# Patient Record
Sex: FEMALE | Race: WHITE | URBAN - METROPOLITAN AREA
[De-identification: names, ages, dates, MRNs, and addresses within clinical notes are randomized per-mention and may not be internally consistent; named-entity substitution may affect disease eponyms.]

---

## 2017-11-01 PROBLEM — E66.9 OBESITY (BMI 35.0-39.9 WITHOUT COMORBIDITY): Status: ACTIVE | Noted: 2017-11-01

## 2018-02-17 PROBLEM — S82.409A FIBULA FRACTURE: Status: ACTIVE | Noted: 2018-02-17

## 2018-02-17 PROBLEM — M79.606 LEG PAIN: Status: ACTIVE | Noted: 2018-02-17

## 2018-04-24 ENCOUNTER — APPOINTMENT (RX ONLY)
Dept: URBAN - METROPOLITAN AREA CLINIC 31 | Facility: CLINIC | Age: 78
Setting detail: DERMATOLOGY
End: 2018-04-24

## 2018-04-24 DIAGNOSIS — L57.0 ACTINIC KERATOSIS: ICD-10-CM

## 2018-04-24 DIAGNOSIS — D18.0 HEMANGIOMA: ICD-10-CM

## 2018-04-24 DIAGNOSIS — L57.8 OTHER SKIN CHANGES DUE TO CHRONIC EXPOSURE TO NONIONIZING RADIATION: ICD-10-CM

## 2018-04-24 DIAGNOSIS — D69.2 OTHER NONTHROMBOCYTOPENIC PURPURA: ICD-10-CM

## 2018-04-24 DIAGNOSIS — L82.1 OTHER SEBORRHEIC KERATOSIS: ICD-10-CM

## 2018-04-24 PROBLEM — D18.01 HEMANGIOMA OF SKIN AND SUBCUTANEOUS TISSUE: Status: ACTIVE | Noted: 2018-04-24

## 2018-04-24 PROBLEM — Z85.828 PERSONAL HISTORY OF OTHER MALIGNANT NEOPLASM OF SKIN: Status: ACTIVE | Noted: 2018-04-24

## 2018-04-24 PROBLEM — D48.5 NEOPLASM OF UNCERTAIN BEHAVIOR OF SKIN: Status: ACTIVE | Noted: 2018-04-24

## 2018-04-24 PROBLEM — E05.90 THYROTOXICOSIS, UNSPECIFIED WITHOUT THYROTOXIC CRISIS OR STORM: Status: ACTIVE | Noted: 2018-04-24

## 2018-04-24 PROBLEM — E13.9 OTHER SPECIFIED DIABETES MELLITUS WITHOUT COMPLICATIONS: Status: ACTIVE | Noted: 2018-04-24

## 2018-04-24 PROCEDURE — 17004 DESTROY PREMAL LESIONS 15/>: CPT

## 2018-04-24 PROCEDURE — 99202 OFFICE O/P NEW SF 15 MIN: CPT | Mod: 25

## 2018-04-24 PROCEDURE — ? COUNSELING

## 2018-04-24 PROCEDURE — ? LIQUID NITROGEN

## 2018-04-24 PROCEDURE — 11100: CPT | Mod: 59

## 2018-04-24 PROCEDURE — ? BIOPSY BY SHAVE METHOD

## 2018-04-24 ASSESSMENT — LOCATION SIMPLE DESCRIPTION DERM
LOCATION SIMPLE: LEFT POSTERIOR UPPER ARM
LOCATION SIMPLE: CHEST
LOCATION SIMPLE: RIGHT ELBOW
LOCATION SIMPLE: LEFT HAND
LOCATION SIMPLE: RIGHT UPPER ARM
LOCATION SIMPLE: RIGHT HAND
LOCATION SIMPLE: LEFT CHEEK
LOCATION SIMPLE: LEFT ANTERIOR NECK
LOCATION SIMPLE: RIGHT FOREARM
LOCATION SIMPLE: LEFT FOREARM
LOCATION SIMPLE: UPPER BACK

## 2018-04-24 ASSESSMENT — LOCATION ZONE DERM
LOCATION ZONE: NECK
LOCATION ZONE: TRUNK
LOCATION ZONE: ARM
LOCATION ZONE: HAND
LOCATION ZONE: FACE

## 2018-04-24 ASSESSMENT — LOCATION DETAILED DESCRIPTION DERM
LOCATION DETAILED: INFERIOR THORACIC SPINE
LOCATION DETAILED: LEFT ULNAR DORSAL HAND
LOCATION DETAILED: LEFT RADIAL DORSAL HAND
LOCATION DETAILED: LEFT DISTAL POSTERIOR UPPER ARM
LOCATION DETAILED: LEFT DISTAL DORSAL FOREARM
LOCATION DETAILED: RIGHT PROXIMAL DORSAL FOREARM
LOCATION DETAILED: RIGHT LATERAL ELBOW
LOCATION DETAILED: RIGHT ANTERIOR DISTAL UPPER ARM
LOCATION DETAILED: LEFT PROXIMAL DORSAL FOREARM
LOCATION DETAILED: RIGHT DORSAL MIDDLE METACARPOPHALANGEAL JOINT
LOCATION DETAILED: LEFT SUPERIOR ANTERIOR NECK
LOCATION DETAILED: LEFT VENTRAL PROXIMAL FOREARM
LOCATION DETAILED: LEFT CENTRAL MALAR CHEEK
LOCATION DETAILED: RIGHT MEDIAL INFERIOR CHEST
LOCATION DETAILED: RIGHT RADIAL DORSAL HAND
LOCATION DETAILED: RIGHT DISTAL DORSAL FOREARM
LOCATION DETAILED: RIGHT ULNAR DORSAL HAND

## 2018-04-24 NOTE — PROCEDURE: BIOPSY BY SHAVE METHOD
Bill [id number] For Specimen Handling/Conveyance To Laboratory?: no
Cryotherapy Text: The wound bed was treated with cryotherapy after the biopsy was performed.
X Size Of Lesion In Cm: 0
Dressing: bandage
Wound Care: Petrolatum
Was A Bandage Applied: Yes
Detail Level: Detailed
Lab Facility: 
Lab: [id number]
Type Of Destruction Used: Curettage
Biopsy Type: H and E
Hemostasis: Aluminum Chloride and Electrocautery
Electrodesiccation And Curettage Text: The wound bed was treated with electrodesiccation and curettage after the biopsy was performed.
Notification Instructions: Patient will be notified of biopsy results. However, patient instructed to call the office if not contacted within 2 weeks.
Silver Nitrate Text: The wound bed was treated with silver nitrate after the biopsy was performed.
Post-Care Instructions: I reviewed with the patient in detail post-care instructions. Patient is to keep the biopsy site bandaged overnight, and then wash once daily with soap and water and then apply a thin layer of petrolatum once daily until healed.
Curettage Text: The wound bed was treated with curettage after the biopsy was performed.
Billing Type: Third-Party Bill
Consent: Written consent was obtained and risks were reviewed including but not limited to scarring, infection, bleeding, scabbing, incomplete removal, nerve damage and allergy to anesthesia.
Anesthesia Type: 1% lidocaine with epinephrine
Size Of Lesion In Cm: 0.8
Electrodesiccation Text: The wound bed was treated with electrodesiccation after the biopsy was performed.

## 2018-04-24 NOTE — PROCEDURE: LIQUID NITROGEN
Consent: The patient's consent was obtained including but not limited to risks of crusting, scabbing, blistering, scarring, darker or lighter pigmentary change, recurrence, incomplete removal and infection.
Duration Of Freeze Thaw-Cycle (Seconds): 15
Number Of Freeze-Thaw Cycles: 1 freeze-thaw cycle
Render Post-Care Instructions In Note?: no
Detail Level: Detailed
Post-Care Instructions: I reviewed with the patient in detail post-care instructions. Patient is to wear sunprotection, and avoid picking at any of the treated lesions. Pt may apply Vaseline to crusted or scabbing areas.

## 2018-05-30 PROBLEM — Z78.0 POSTMENOPAUSAL: Status: ACTIVE | Noted: 2018-01-01

## 2019-01-01 ENCOUNTER — APPOINTMENT (OUTPATIENT)
Dept: RADIOLOGY | Facility: MEDICAL CENTER | Age: 79
DRG: 082 | End: 2019-01-01
Payer: MEDICARE

## 2019-01-01 ENCOUNTER — HOSPITAL ENCOUNTER (OUTPATIENT)
Dept: RADIOLOGY | Facility: MEDICAL CENTER | Age: 79
End: 2019-03-29

## 2019-01-01 ENCOUNTER — APPOINTMENT (OUTPATIENT)
Dept: RADIOLOGY | Facility: MEDICAL CENTER | Age: 79
DRG: 082 | End: 2019-01-01
Attending: SURGERY
Payer: MEDICARE

## 2019-01-01 ENCOUNTER — HOSPITAL ENCOUNTER (INPATIENT)
Facility: MEDICAL CENTER | Age: 79
LOS: 3 days | DRG: 082 | End: 2019-04-01
Attending: EMERGENCY MEDICINE | Admitting: SURGERY
Payer: MEDICARE

## 2019-01-01 ENCOUNTER — APPOINTMENT (OUTPATIENT)
Dept: RADIOLOGY | Facility: MEDICAL CENTER | Age: 79
DRG: 082 | End: 2019-01-01
Attending: NURSE PRACTITIONER
Payer: MEDICARE

## 2019-01-01 VITALS
TEMPERATURE: 99 F | HEIGHT: 66 IN | HEART RATE: 90 BPM | WEIGHT: 193.34 LBS | SYSTOLIC BLOOD PRESSURE: 120 MMHG | DIASTOLIC BLOOD PRESSURE: 58 MMHG | RESPIRATION RATE: 5 BRPM | BODY MASS INDEX: 31.07 KG/M2 | OXYGEN SATURATION: 40 %

## 2019-01-01 LAB
ABO GROUP BLD: NORMAL
ABO GROUP BLD: NORMAL
ACTION RANGE TRIGGERED IACRT: NO
ALBUMIN SERPL BCP-MCNC: 3.6 G/DL (ref 3.2–4.9)
ALBUMIN SERPL BCP-MCNC: 3.8 G/DL (ref 3.2–4.9)
ALBUMIN SERPL BCP-MCNC: 4 G/DL (ref 3.2–4.9)
ALBUMIN/GLOB SERPL: 1.3 G/DL
ALP SERPL-CCNC: 66 U/L (ref 30–99)
ALP SERPL-CCNC: 74 U/L (ref 30–99)
ALP SERPL-CCNC: 77 U/L (ref 30–99)
ALT SERPL-CCNC: 11 U/L (ref 2–50)
ALT SERPL-CCNC: 13 U/L (ref 2–50)
ALT SERPL-CCNC: 15 U/L (ref 2–50)
ANION GAP SERPL CALC-SCNC: 15 MMOL/L (ref 0–11.9)
ANION GAP SERPL CALC-SCNC: 8 MMOL/L (ref 0–11.9)
ANION GAP SERPL CALC-SCNC: 8 MMOL/L (ref 0–11.9)
ANION GAP SERPL CALC-SCNC: 9 MMOL/L (ref 0–11.9)
APTT PPP: 29.4 SEC (ref 24.7–36)
AST SERPL-CCNC: 21 U/L (ref 12–45)
AST SERPL-CCNC: 25 U/L (ref 12–45)
AST SERPL-CCNC: 28 U/L (ref 12–45)
BARCODED ABORH UBTYP: 5100
BARCODED ABORH UBTYP: 6200
BARCODED ABORH UBTYP: 6200
BARCODED PRD CODE UBPRD: NORMAL
BARCODED UNIT NUM UBUNT: NORMAL
BASE EXCESS BLDA CALC-SCNC: -5 MMOL/L (ref -4–3)
BASOPHILS # BLD AUTO: 0.2 % (ref 0–1.8)
BASOPHILS # BLD AUTO: 0.2 % (ref 0–1.8)
BASOPHILS # BLD: 0.03 K/UL (ref 0–0.12)
BASOPHILS # BLD: 0.04 K/UL (ref 0–0.12)
BILIRUB SERPL-MCNC: 0.6 MG/DL (ref 0.1–1.5)
BILIRUB SERPL-MCNC: 0.8 MG/DL (ref 0.1–1.5)
BILIRUB SERPL-MCNC: 0.9 MG/DL (ref 0.1–1.5)
BLD GP AB SCN SERPL QL: NORMAL
BODY TEMPERATURE: ABNORMAL DEGREES
BUN SERPL-MCNC: 21 MG/DL (ref 8–22)
BUN SERPL-MCNC: 22 MG/DL (ref 8–22)
BUN SERPL-MCNC: 23 MG/DL (ref 8–22)
BUN SERPL-MCNC: 23 MG/DL (ref 8–22)
CALCIUM SERPL-MCNC: 10.2 MG/DL (ref 8.5–10.5)
CALCIUM SERPL-MCNC: 10.5 MG/DL (ref 8.5–10.5)
CALCIUM SERPL-MCNC: 9.5 MG/DL (ref 8.5–10.5)
CALCIUM SERPL-MCNC: 9.9 MG/DL (ref 8.5–10.5)
CFT BLD TEG: 4.2 MIN (ref 5–10)
CFT BLD TEG: 4.3 MIN (ref 5–10)
CFT BLD TEG: 4.3 MIN (ref 5–10)
CFT BLD TEG: 4.5 MIN (ref 5–10)
CHLORIDE SERPL-SCNC: 100 MMOL/L (ref 96–112)
CHLORIDE SERPL-SCNC: 101 MMOL/L (ref 96–112)
CLOT ANGLE BLD TEG: 74.3 DEGREES (ref 53–72)
CLOT ANGLE BLD TEG: 74.9 DEGREES (ref 53–72)
CLOT ANGLE BLD TEG: 75.4 DEGREES (ref 53–72)
CLOT ANGLE BLD TEG: 75.5 DEGREES (ref 53–72)
CLOT LYSIS 30M P MA LENFR BLD TEG: 0 % (ref 0–8)
CO2 BLDA-SCNC: 22 MMOL/L (ref 20–33)
CO2 SERPL-SCNC: 17 MMOL/L (ref 20–33)
CO2 SERPL-SCNC: 20 MMOL/L (ref 20–33)
CO2 SERPL-SCNC: 21 MMOL/L (ref 20–33)
CO2 SERPL-SCNC: 23 MMOL/L (ref 20–33)
COMPONENT P 8504P: NORMAL
CREAT SERPL-MCNC: 0.78 MG/DL (ref 0.5–1.4)
CREAT SERPL-MCNC: 0.98 MG/DL (ref 0.5–1.4)
CREAT SERPL-MCNC: 1.11 MG/DL (ref 0.5–1.4)
CREAT SERPL-MCNC: 1.45 MG/DL (ref 0.5–1.4)
CT.EXTRINSIC BLD ROTEM: 1 MIN (ref 1–3)
CT.EXTRINSIC BLD ROTEM: 1.1 MIN (ref 1–3)
EOSINOPHIL # BLD AUTO: 0 K/UL (ref 0–0.51)
EOSINOPHIL # BLD AUTO: 0.02 K/UL (ref 0–0.51)
EOSINOPHIL NFR BLD: 0 % (ref 0–6.9)
EOSINOPHIL NFR BLD: 0.1 % (ref 0–6.9)
ERYTHROCYTE [DISTWIDTH] IN BLOOD BY AUTOMATED COUNT: 49.6 FL (ref 35.9–50)
ERYTHROCYTE [DISTWIDTH] IN BLOOD BY AUTOMATED COUNT: 50 FL (ref 35.9–50)
ERYTHROCYTE [DISTWIDTH] IN BLOOD BY AUTOMATED COUNT: 51.9 FL (ref 35.9–50)
EST. AVERAGE GLUCOSE BLD GHB EST-MCNC: 128 MG/DL
ETHANOL BLD-MCNC: 0 G/DL
GLOBULIN SER CALC-MCNC: 2.8 G/DL (ref 1.9–3.5)
GLOBULIN SER CALC-MCNC: 3 G/DL (ref 1.9–3.5)
GLOBULIN SER CALC-MCNC: 3 G/DL (ref 1.9–3.5)
GLUCOSE BLD-MCNC: 127 MG/DL (ref 65–99)
GLUCOSE BLD-MCNC: 137 MG/DL (ref 65–99)
GLUCOSE BLD-MCNC: 147 MG/DL (ref 65–99)
GLUCOSE BLD-MCNC: 164 MG/DL (ref 65–99)
GLUCOSE BLD-MCNC: 168 MG/DL (ref 65–99)
GLUCOSE BLD-MCNC: 172 MG/DL (ref 65–99)
GLUCOSE BLD-MCNC: 176 MG/DL (ref 65–99)
GLUCOSE SERPL-MCNC: 129 MG/DL (ref 65–99)
GLUCOSE SERPL-MCNC: 142 MG/DL (ref 65–99)
GLUCOSE SERPL-MCNC: 142 MG/DL (ref 65–99)
GLUCOSE SERPL-MCNC: 180 MG/DL (ref 65–99)
HBA1C MFR BLD: 6.1 % (ref 0–5.6)
HCO3 BLDA-SCNC: 20.6 MMOL/L (ref 17–25)
HCT VFR BLD AUTO: 31.8 % (ref 37–47)
HCT VFR BLD AUTO: 32.8 % (ref 37–47)
HCT VFR BLD AUTO: 34.5 % (ref 37–47)
HGB BLD-MCNC: 10.2 G/DL (ref 12–16)
HGB BLD-MCNC: 10.4 G/DL (ref 12–16)
HGB BLD-MCNC: 11.2 G/DL (ref 12–16)
HOROWITZ INDEX BLDA+IHG-RTO: 134 MM[HG]
IMM GRANULOCYTES # BLD AUTO: 0.06 K/UL (ref 0–0.11)
IMM GRANULOCYTES # BLD AUTO: 0.07 K/UL (ref 0–0.11)
IMM GRANULOCYTES NFR BLD AUTO: 0.4 % (ref 0–0.9)
IMM GRANULOCYTES NFR BLD AUTO: 0.5 % (ref 0–0.9)
INR PPP: 1.06 (ref 0.87–1.13)
INST. QUALIFIED PATIENT IIQPT: YES
LACTATE BLD-SCNC: 4.3 MMOL/L (ref 0.5–2)
LYMPHOCYTES # BLD AUTO: 0.8 K/UL (ref 1–4.8)
LYMPHOCYTES # BLD AUTO: 1.03 K/UL (ref 1–4.8)
LYMPHOCYTES NFR BLD: 4.7 % (ref 22–41)
LYMPHOCYTES NFR BLD: 7.2 % (ref 22–41)
MAGNESIUM SERPL-MCNC: 1.3 MG/DL (ref 1.5–2.5)
MAGNESIUM SERPL-MCNC: 1.4 MG/DL (ref 1.5–2.5)
MCF BLD TEG: 76.3 MM (ref 50–70)
MCF BLD TEG: 76.9 MM (ref 50–70)
MCF BLD TEG: 77.4 MM (ref 50–70)
MCF BLD TEG: 77.8 MM (ref 50–70)
MCH RBC QN AUTO: 29.5 PG (ref 27–33)
MCH RBC QN AUTO: 30.1 PG (ref 27–33)
MCH RBC QN AUTO: 30.2 PG (ref 27–33)
MCHC RBC AUTO-ENTMCNC: 31.7 G/DL (ref 33.6–35)
MCHC RBC AUTO-ENTMCNC: 32.1 G/DL (ref 33.6–35)
MCHC RBC AUTO-ENTMCNC: 32.5 G/DL (ref 33.6–35)
MCV RBC AUTO: 92.7 FL (ref 81.4–97.8)
MCV RBC AUTO: 93.2 FL (ref 81.4–97.8)
MCV RBC AUTO: 94.1 FL (ref 81.4–97.8)
MONOCYTES # BLD AUTO: 1.15 K/UL (ref 0–0.85)
MONOCYTES # BLD AUTO: 1.43 K/UL (ref 0–0.85)
MONOCYTES NFR BLD AUTO: 10 % (ref 0–13.4)
MONOCYTES NFR BLD AUTO: 6.8 % (ref 0–13.4)
NEUTROPHILS # BLD AUTO: 11.71 K/UL (ref 2–7.15)
NEUTROPHILS # BLD AUTO: 14.84 K/UL (ref 2–7.15)
NEUTROPHILS NFR BLD: 82 % (ref 44–72)
NEUTROPHILS NFR BLD: 87.9 % (ref 44–72)
NRBC # BLD AUTO: 0 K/UL
NRBC # BLD AUTO: 0 K/UL
NRBC BLD-RTO: 0 /100 WBC
NRBC BLD-RTO: 0 /100 WBC
O2/TOTAL GAS SETTING VFR VENT: 70 %
PA AA BLD-ACNC: 67.6 %
PA AA BLD-ACNC: 78 %
PA AA BLD-ACNC: 85.6 %
PA AA BLD-ACNC: 94.8 %
PA ADP BLD-ACNC: 77.3 %
PA ADP BLD-ACNC: 88.6 %
PA ADP BLD-ACNC: 93.5 %
PA ADP BLD-ACNC: 93.6 %
PCO2 BLDA: 37.7 MMHG (ref 26–37)
PCO2 TEMP ADJ BLDA: 39 MMHG (ref 26–37)
PH BLDA: 7.34 [PH] (ref 7.4–7.5)
PH TEMP ADJ BLDA: 7.33 [PH] (ref 7.4–7.5)
PHOSPHATE SERPL-MCNC: 2.5 MG/DL (ref 2.5–4.5)
PHOSPHATE SERPL-MCNC: 2.9 MG/DL (ref 2.5–4.5)
PLATELET # BLD AUTO: 222 K/UL (ref 164–446)
PLATELET # BLD AUTO: 265 K/UL (ref 164–446)
PLATELET # BLD AUTO: 268 K/UL (ref 164–446)
PMV BLD AUTO: 11.2 FL (ref 9–12.9)
PMV BLD AUTO: 11.2 FL (ref 9–12.9)
PMV BLD AUTO: 11.4 FL (ref 9–12.9)
PO2 BLDA: 94 MMHG (ref 64–87)
PO2 TEMP ADJ BLDA: 99 MMHG (ref 64–87)
POTASSIUM SERPL-SCNC: 4 MMOL/L (ref 3.6–5.5)
POTASSIUM SERPL-SCNC: 4.3 MMOL/L (ref 3.6–5.5)
POTASSIUM SERPL-SCNC: 4.9 MMOL/L (ref 3.6–5.5)
POTASSIUM SERPL-SCNC: 4.9 MMOL/L (ref 3.6–5.5)
PRODUCT TYPE UPROD: NORMAL
PROT SERPL-MCNC: 6.4 G/DL (ref 6–8.2)
PROT SERPL-MCNC: 6.8 G/DL (ref 6–8.2)
PROT SERPL-MCNC: 7 G/DL (ref 6–8.2)
PROTHROMBIN TIME: 13.9 SEC (ref 12–14.6)
RBC # BLD AUTO: 3.38 M/UL (ref 4.2–5.4)
RBC # BLD AUTO: 3.52 M/UL (ref 4.2–5.4)
RBC # BLD AUTO: 3.72 M/UL (ref 4.2–5.4)
RH BLD: NORMAL
RH BLD: NORMAL
SAO2 % BLDA: 97 % (ref 93–99)
SODIUM SERPL-SCNC: 128 MMOL/L (ref 135–145)
SODIUM SERPL-SCNC: 130 MMOL/L (ref 135–145)
SODIUM SERPL-SCNC: 131 MMOL/L (ref 135–145)
SODIUM SERPL-SCNC: 133 MMOL/L (ref 135–145)
SPECIMEN DRAWN FROM PATIENT: ABNORMAL
TEG ALGORITHM TGALG: ABNORMAL
TRIGL SERPL-MCNC: 113 MG/DL (ref 0–149)
TRIGL SERPL-MCNC: 117 MG/DL (ref 0–149)
TSH SERPL DL<=0.005 MIU/L-ACNC: 0.84 UIU/ML (ref 0.38–5.33)
TSH SERPL DL<=0.005 MIU/L-ACNC: 1.38 UIU/ML (ref 0.38–5.33)
UNIT STATUS USTAT: NORMAL
WBC # BLD AUTO: 14.3 K/UL (ref 4.8–10.8)
WBC # BLD AUTO: 16.9 K/UL (ref 4.8–10.8)
WBC # BLD AUTO: 17.1 K/UL (ref 4.8–10.8)

## 2019-01-01 PROCEDURE — A9270 NON-COVERED ITEM OR SERVICE: HCPCS | Performed by: NURSE PRACTITIONER

## 2019-01-01 PROCEDURE — 85576 BLOOD PLATELET AGGREGATION: CPT | Mod: 91

## 2019-01-01 PROCEDURE — 71045 X-RAY EXAM CHEST 1 VIEW: CPT

## 2019-01-01 PROCEDURE — 0BH17EZ INSERTION OF ENDOTRACHEAL AIRWAY INTO TRACHEA, VIA NATURAL OR ARTIFICIAL OPENING: ICD-10-PCS | Performed by: SURGERY

## 2019-01-01 PROCEDURE — 770022 HCHG ROOM/CARE - ICU (200)

## 2019-01-01 PROCEDURE — 302977 HCHG BRONCHOSCOPY PROC-THERAPEUTIC

## 2019-01-01 PROCEDURE — 86850 RBC ANTIBODY SCREEN: CPT

## 2019-01-01 PROCEDURE — G0390 TRAUMA RESPONS W/HOSP CRITI: HCPCS

## 2019-01-01 PROCEDURE — 700111 HCHG RX REV CODE 636 W/ 250 OVERRIDE (IP): Performed by: NURSE PRACTITIONER

## 2019-01-01 PROCEDURE — 92950 HEART/LUNG RESUSCITATION CPR: CPT

## 2019-01-01 PROCEDURE — 84100 ASSAY OF PHOSPHORUS: CPT

## 2019-01-01 PROCEDURE — A9270 NON-COVERED ITEM OR SERVICE: HCPCS | Performed by: SURGERY

## 2019-01-01 PROCEDURE — 82803 BLOOD GASES ANY COMBINATION: CPT

## 2019-01-01 PROCEDURE — 700105 HCHG RX REV CODE 258: Performed by: NURSE PRACTITIONER

## 2019-01-01 PROCEDURE — 700112 HCHG RX REV CODE 229: Performed by: NURSE PRACTITIONER

## 2019-01-01 PROCEDURE — 82962 GLUCOSE BLOOD TEST: CPT | Mod: 91

## 2019-01-01 PROCEDURE — 36600 WITHDRAWAL OF ARTERIAL BLOOD: CPT

## 2019-01-01 PROCEDURE — 0B918ZZ DRAINAGE OF TRACHEA, VIA NATURAL OR ARTIFICIAL OPENING ENDOSCOPIC: ICD-10-PCS | Performed by: SURGERY

## 2019-01-01 PROCEDURE — 700111 HCHG RX REV CODE 636 W/ 250 OVERRIDE (IP): Performed by: SURGERY

## 2019-01-01 PROCEDURE — 94002 VENT MGMT INPAT INIT DAY: CPT

## 2019-01-01 PROCEDURE — 99291 CRITICAL CARE FIRST HOUR: CPT | Performed by: SURGERY

## 2019-01-01 PROCEDURE — 31500 INSERT EMERGENCY AIRWAY: CPT

## 2019-01-01 PROCEDURE — 80053 COMPREHEN METABOLIC PANEL: CPT

## 2019-01-01 PROCEDURE — 700102 HCHG RX REV CODE 250 W/ 637 OVERRIDE(OP): Performed by: NURSE PRACTITIONER

## 2019-01-01 PROCEDURE — P9034 PLATELETS, PHERESIS: HCPCS

## 2019-01-01 PROCEDURE — 30233N1 TRANSFUSION OF NONAUTOLOGOUS RED BLOOD CELLS INTO PERIPHERAL VEIN, PERCUTANEOUS APPROACH: ICD-10-PCS | Performed by: SURGERY

## 2019-01-01 PROCEDURE — 36430 TRANSFUSION BLD/BLD COMPNT: CPT

## 2019-01-01 PROCEDURE — 700102 HCHG RX REV CODE 250 W/ 637 OVERRIDE(OP): Performed by: SURGERY

## 2019-01-01 PROCEDURE — 84478 ASSAY OF TRIGLYCERIDES: CPT

## 2019-01-01 PROCEDURE — 700101 HCHG RX REV CODE 250: Performed by: SURGERY

## 2019-01-01 PROCEDURE — 84443 ASSAY THYROID STIM HORMONE: CPT

## 2019-01-01 PROCEDURE — 85610 PROTHROMBIN TIME: CPT | Mod: 91

## 2019-01-01 PROCEDURE — 85384 FIBRINOGEN ACTIVITY: CPT

## 2019-01-01 PROCEDURE — 80048 BASIC METABOLIC PNL TOTAL CA: CPT

## 2019-01-01 PROCEDURE — 93970 EXTREMITY STUDY: CPT

## 2019-01-01 PROCEDURE — 85347 COAGULATION TIME ACTIVATED: CPT

## 2019-01-01 PROCEDURE — 94003 VENT MGMT INPAT SUBQ DAY: CPT

## 2019-01-01 PROCEDURE — 83735 ASSAY OF MAGNESIUM: CPT

## 2019-01-01 PROCEDURE — 86900 BLOOD TYPING SEROLOGIC ABO: CPT

## 2019-01-01 PROCEDURE — 82962 GLUCOSE BLOOD TEST: CPT

## 2019-01-01 PROCEDURE — 99291 CRITICAL CARE FIRST HOUR: CPT

## 2019-01-01 PROCEDURE — 99152 MOD SED SAME PHYS/QHP 5/>YRS: CPT

## 2019-01-01 PROCEDURE — 5A1945Z RESPIRATORY VENTILATION, 24-96 CONSECUTIVE HOURS: ICD-10-PCS | Performed by: SURGERY

## 2019-01-01 PROCEDURE — 85025 COMPLETE CBC W/AUTO DIFF WBC: CPT

## 2019-01-01 PROCEDURE — 85027 COMPLETE CBC AUTOMATED: CPT

## 2019-01-01 PROCEDURE — 99153 MOD SED SAME PHYS/QHP EA: CPT

## 2019-01-01 PROCEDURE — 86901 BLOOD TYPING SEROLOGIC RH(D): CPT

## 2019-01-01 PROCEDURE — 83605 ASSAY OF LACTIC ACID: CPT

## 2019-01-01 PROCEDURE — 70450 CT HEAD/BRAIN W/O DYE: CPT

## 2019-01-01 PROCEDURE — 83036 HEMOGLOBIN GLYCOSYLATED A1C: CPT

## 2019-01-01 PROCEDURE — 85730 THROMBOPLASTIN TIME PARTIAL: CPT

## 2019-01-01 PROCEDURE — 99222 1ST HOSP IP/OBS MODERATE 55: CPT | Performed by: INTERNAL MEDICINE

## 2019-01-01 RX ORDER — SODIUM CHLORIDE 9 MG/ML
INJECTION, SOLUTION INTRAVENOUS CONTINUOUS
Status: DISCONTINUED | OUTPATIENT
Start: 2019-01-01 | End: 2019-01-01

## 2019-01-01 RX ORDER — DEXTROSE MONOHYDRATE 25 G/50ML
25 INJECTION, SOLUTION INTRAVENOUS
Status: DISCONTINUED | OUTPATIENT
Start: 2019-01-01 | End: 2019-01-01

## 2019-01-01 RX ORDER — LOSARTAN POTASSIUM 50 MG/1
100 TABLET ORAL DAILY
Status: DISCONTINUED | OUTPATIENT
Start: 2019-01-01 | End: 2019-01-01

## 2019-01-01 RX ORDER — SIMVASTATIN 20 MG
20 TABLET ORAL EVERY EVENING
Status: DISCONTINUED | OUTPATIENT
Start: 2019-01-01 | End: 2019-01-01

## 2019-01-01 RX ORDER — FAMOTIDINE 20 MG/1
20 TABLET, FILM COATED ORAL EVERY 24 HOURS
Status: DISCONTINUED | OUTPATIENT
Start: 2019-01-01 | End: 2019-01-01

## 2019-01-01 RX ORDER — FAMOTIDINE 20 MG/1
20 TABLET, FILM COATED ORAL EVERY 12 HOURS
Status: DISCONTINUED | OUTPATIENT
Start: 2019-01-01 | End: 2019-01-01

## 2019-01-01 RX ORDER — DOCUSATE SODIUM 100 MG/1
100 CAPSULE, LIQUID FILLED ORAL 2 TIMES DAILY
Status: DISCONTINUED | OUTPATIENT
Start: 2019-01-01 | End: 2019-01-01

## 2019-01-01 RX ORDER — POLYETHYLENE GLYCOL 3350 17 G/17G
1 POWDER, FOR SOLUTION ORAL 2 TIMES DAILY
Status: DISCONTINUED | OUTPATIENT
Start: 2019-01-01 | End: 2019-01-01

## 2019-01-01 RX ORDER — HALOPERIDOL 5 MG/ML
1 INJECTION INTRAMUSCULAR EVERY 4 HOURS PRN
Status: DISCONTINUED | OUTPATIENT
Start: 2019-01-01 | End: 2019-01-01

## 2019-01-01 RX ORDER — MORPHINE SULFATE 10 MG/ML
5 INJECTION, SOLUTION INTRAMUSCULAR; INTRAVENOUS
Status: DISCONTINUED | OUTPATIENT
Start: 2019-01-01 | End: 2019-01-01 | Stop reason: HOSPADM

## 2019-01-01 RX ORDER — CITALOPRAM 20 MG/1
20 TABLET ORAL DAILY
Status: DISCONTINUED | OUTPATIENT
Start: 2019-01-01 | End: 2019-01-01

## 2019-01-01 RX ORDER — VECURONIUM BROMIDE 1 MG/ML
10 INJECTION, POWDER, LYOPHILIZED, FOR SOLUTION INTRAVENOUS ONCE
Status: COMPLETED | OUTPATIENT
Start: 2019-01-01 | End: 2019-01-01

## 2019-01-01 RX ORDER — BUSPIRONE HYDROCHLORIDE 5 MG/1
7.5 TABLET ORAL 2 TIMES DAILY
Status: DISCONTINUED | OUTPATIENT
Start: 2019-01-01 | End: 2019-01-01

## 2019-01-01 RX ORDER — MAGNESIUM SULFATE HEPTAHYDRATE 40 MG/ML
4 INJECTION, SOLUTION INTRAVENOUS ONCE
Status: DISCONTINUED | OUTPATIENT
Start: 2019-01-01 | End: 2019-01-01

## 2019-01-01 RX ORDER — ATROPINE SULFATE 10 MG/ML
2 SOLUTION/ DROPS OPHTHALMIC EVERY 4 HOURS PRN
Status: DISCONTINUED | OUTPATIENT
Start: 2019-01-01 | End: 2019-01-01 | Stop reason: HOSPADM

## 2019-01-01 RX ORDER — DONEPEZIL HYDROCHLORIDE 5 MG/1
10 TABLET, FILM COATED ORAL
Status: DISCONTINUED | OUTPATIENT
Start: 2019-01-01 | End: 2019-01-01

## 2019-01-01 RX ORDER — OXYCODONE HYDROCHLORIDE 5 MG/1
5 TABLET ORAL
Status: DISCONTINUED | OUTPATIENT
Start: 2019-01-01 | End: 2019-01-01

## 2019-01-01 RX ORDER — LORAZEPAM 2 MG/ML
1 CONCENTRATE ORAL
Status: DISCONTINUED | OUTPATIENT
Start: 2019-01-01 | End: 2019-01-01 | Stop reason: HOSPADM

## 2019-01-01 RX ORDER — ONDANSETRON 2 MG/ML
4 INJECTION INTRAMUSCULAR; INTRAVENOUS EVERY 4 HOURS PRN
Status: DISCONTINUED | OUTPATIENT
Start: 2019-01-01 | End: 2019-01-01 | Stop reason: HOSPADM

## 2019-01-01 RX ORDER — ENEMA 19; 7 G/133ML; G/133ML
1 ENEMA RECTAL
Status: DISCONTINUED | OUTPATIENT
Start: 2019-01-01 | End: 2019-01-01

## 2019-01-01 RX ORDER — OXYCODONE HYDROCHLORIDE 10 MG/1
10 TABLET ORAL
Status: DISCONTINUED | OUTPATIENT
Start: 2019-01-01 | End: 2019-01-01

## 2019-01-01 RX ORDER — PROPOFOL 10 MG/ML
100 INJECTION, EMULSION INTRAVENOUS ONCE
Status: COMPLETED | OUTPATIENT
Start: 2019-01-01 | End: 2019-01-01

## 2019-01-01 RX ORDER — HYDRALAZINE HYDROCHLORIDE 20 MG/ML
20 INJECTION INTRAMUSCULAR; INTRAVENOUS EVERY 6 HOURS PRN
Status: DISCONTINUED | OUTPATIENT
Start: 2019-01-01 | End: 2019-01-01

## 2019-01-01 RX ORDER — DONEPEZIL HYDROCHLORIDE 5 MG/1
10 TABLET, FILM COATED ORAL EVERY EVENING
Status: DISCONTINUED | OUTPATIENT
Start: 2019-01-01 | End: 2019-01-01

## 2019-01-01 RX ORDER — AMOXICILLIN 250 MG
1 CAPSULE ORAL
Status: DISCONTINUED | OUTPATIENT
Start: 2019-01-01 | End: 2019-01-01

## 2019-01-01 RX ORDER — BISACODYL 10 MG
10 SUPPOSITORY, RECTAL RECTAL
Status: DISCONTINUED | OUTPATIENT
Start: 2019-01-01 | End: 2019-01-01

## 2019-01-01 RX ORDER — AMOXICILLIN 250 MG
1 CAPSULE ORAL NIGHTLY
Status: DISCONTINUED | OUTPATIENT
Start: 2019-01-01 | End: 2019-01-01

## 2019-01-01 RX ORDER — LORAZEPAM 2 MG/ML
1 INJECTION INTRAMUSCULAR
Status: DISCONTINUED | OUTPATIENT
Start: 2019-01-01 | End: 2019-01-01 | Stop reason: HOSPADM

## 2019-01-01 RX ORDER — LEVETIRACETAM 500 MG/1
500 TABLET ORAL 2 TIMES DAILY
Status: DISCONTINUED | OUTPATIENT
Start: 2019-01-01 | End: 2019-01-01

## 2019-01-01 RX ORDER — DOCUSATE SODIUM 50 MG/5ML
100 LIQUID ORAL 2 TIMES DAILY
Status: DISCONTINUED | OUTPATIENT
Start: 2019-01-01 | End: 2019-01-01

## 2019-01-01 RX ORDER — LEVOTHYROXINE SODIUM 0.12 MG/1
125 TABLET ORAL
Status: DISCONTINUED | OUTPATIENT
Start: 2019-01-01 | End: 2019-01-01

## 2019-01-01 RX ORDER — MORPHINE SULFATE 10 MG/ML
10 INJECTION, SOLUTION INTRAMUSCULAR; INTRAVENOUS
Status: DISCONTINUED | OUTPATIENT
Start: 2019-01-01 | End: 2019-01-01 | Stop reason: HOSPADM

## 2019-01-01 RX ADMIN — MORPHINE SULFATE 10 MG: 10 INJECTION INTRAVENOUS at 23:57

## 2019-01-01 RX ADMIN — SODIUM CHLORIDE: 9 INJECTION, SOLUTION INTRAVENOUS at 21:33

## 2019-01-01 RX ADMIN — LORAZEPAM 1 MG: 2 INJECTION INTRAMUSCULAR; INTRAVENOUS at 20:22

## 2019-01-01 RX ADMIN — LORAZEPAM 1 MG: 2 INJECTION INTRAMUSCULAR; INTRAVENOUS at 21:36

## 2019-01-01 RX ADMIN — LORAZEPAM 1 MG: 2 INJECTION INTRAMUSCULAR; INTRAVENOUS at 22:43

## 2019-01-01 RX ADMIN — ATROPINE SULFATE 2 DROP: 10 SOLUTION/ DROPS OPHTHALMIC at 00:00

## 2019-01-01 RX ADMIN — PROPOFOL 65 MCG/KG/MIN: 10 INJECTION, EMULSION INTRAVENOUS at 15:53

## 2019-01-01 RX ADMIN — LORAZEPAM 1 MG: 2 INJECTION INTRAMUSCULAR; INTRAVENOUS at 00:59

## 2019-01-01 RX ADMIN — CITALOPRAM HYDROBROMIDE 20 MG: 20 TABLET ORAL at 11:47

## 2019-01-01 RX ADMIN — POLYETHYLENE GLYCOL 3350 1 PACKET: 17 POWDER, FOR SOLUTION ORAL at 18:00

## 2019-01-01 RX ADMIN — SENNOSIDES AND DOCUSATE SODIUM 1 TABLET: 8.6; 5 TABLET ORAL at 21:00

## 2019-01-01 RX ADMIN — PROPOFOL 50 MCG/KG/MIN: 10 INJECTION, EMULSION INTRAVENOUS at 09:26

## 2019-01-01 RX ADMIN — BUSPIRONE HYDROCHLORIDE 7.5 MG: 5 TABLET ORAL at 11:46

## 2019-01-01 RX ADMIN — FENTANYL CITRATE 50 MCG: 50 INJECTION INTRAMUSCULAR; INTRAVENOUS at 03:02

## 2019-01-01 RX ADMIN — PROPOFOL 60 MCG/KG/MIN: 10 INJECTION, EMULSION INTRAVENOUS at 04:14

## 2019-01-01 RX ADMIN — POLYETHYLENE GLYCOL 3350 1 PACKET: 17 POWDER, FOR SOLUTION ORAL at 05:11

## 2019-01-01 RX ADMIN — HYDRALAZINE HYDROCHLORIDE 20 MG: 20 INJECTION INTRAMUSCULAR; INTRAVENOUS at 10:20

## 2019-01-01 RX ADMIN — PROPOFOL 65 MCG/KG/MIN: 10 INJECTION, EMULSION INTRAVENOUS at 08:18

## 2019-01-01 RX ADMIN — PROPOFOL 65 MCG/KG/MIN: 10 INJECTION, EMULSION INTRAVENOUS at 02:40

## 2019-01-01 RX ADMIN — DOCUSATE SODIUM 100 MG: 50 LIQUID ORAL at 17:24

## 2019-01-01 RX ADMIN — PROPOFOL 45 MCG/KG/MIN: 10 INJECTION, EMULSION INTRAVENOUS at 22:03

## 2019-01-01 RX ADMIN — LOSARTAN POTASSIUM 100 MG: 50 TABLET ORAL at 05:01

## 2019-01-01 RX ADMIN — FENTANYL CITRATE 100 MCG: 50 INJECTION INTRAMUSCULAR; INTRAVENOUS at 22:45

## 2019-01-01 RX ADMIN — MORPHINE SULFATE 10 MG: 10 INJECTION INTRAVENOUS at 21:36

## 2019-01-01 RX ADMIN — LEVOTHYROXINE SODIUM 125 MCG: 125 TABLET ORAL at 05:02

## 2019-01-01 RX ADMIN — FENTANYL CITRATE 100 MCG: 50 INJECTION INTRAMUSCULAR; INTRAVENOUS at 00:55

## 2019-01-01 RX ADMIN — SODIUM CHLORIDE: 9 INJECTION, SOLUTION INTRAVENOUS at 16:00

## 2019-01-01 RX ADMIN — PROPOFOL 60 MCG/KG/MIN: 10 INJECTION, EMULSION INTRAVENOUS at 18:31

## 2019-01-01 RX ADMIN — LEVOTHYROXINE SODIUM 125 MCG: 125 TABLET ORAL at 11:46

## 2019-01-01 RX ADMIN — MORPHINE SULFATE 10 MG: 10 INJECTION INTRAVENOUS at 16:07

## 2019-01-01 RX ADMIN — PROPOFOL 100 MG: 10 INJECTION, EMULSION INTRAVENOUS at 19:54

## 2019-01-01 RX ADMIN — PROPOFOL 50 MCG/KG/MIN: 10 INJECTION, EMULSION INTRAVENOUS at 13:00

## 2019-01-01 RX ADMIN — LORAZEPAM 1 MG: 2 INJECTION INTRAMUSCULAR; INTRAVENOUS at 02:08

## 2019-01-01 RX ADMIN — FENTANYL CITRATE 100 MCG: 50 INJECTION INTRAMUSCULAR; INTRAVENOUS at 06:54

## 2019-01-01 RX ADMIN — HYDRALAZINE HYDROCHLORIDE 20 MG: 20 INJECTION INTRAMUSCULAR; INTRAVENOUS at 18:27

## 2019-01-01 RX ADMIN — SODIUM CHLORIDE 500 MG: 9 INJECTION, SOLUTION INTRAVENOUS at 05:01

## 2019-01-01 RX ADMIN — FAMOTIDINE 20 MG: 10 INJECTION INTRAVENOUS at 05:11

## 2019-01-01 RX ADMIN — MORPHINE SULFATE 10 MG: 10 INJECTION INTRAVENOUS at 00:59

## 2019-01-01 RX ADMIN — LORAZEPAM 1 MG: 2 INJECTION INTRAMUSCULAR; INTRAVENOUS at 23:56

## 2019-01-01 RX ADMIN — PROPOFOL 60 MCG/KG/MIN: 10 INJECTION, EMULSION INTRAVENOUS at 21:33

## 2019-01-01 RX ADMIN — FENTANYL CITRATE 100 MCG: 50 INJECTION INTRAMUSCULAR; INTRAVENOUS at 20:30

## 2019-01-01 RX ADMIN — MORPHINE SULFATE 10 MG: 10 INJECTION INTRAVENOUS at 17:14

## 2019-01-01 RX ADMIN — SODIUM CHLORIDE 500 MG: 9 INJECTION, SOLUTION INTRAVENOUS at 17:26

## 2019-01-01 RX ADMIN — SODIUM CHLORIDE 500 MG: 9 INJECTION, SOLUTION INTRAVENOUS at 05:11

## 2019-01-01 RX ADMIN — PROPOFOL 65 MCG/KG/MIN: 10 INJECTION, EMULSION INTRAVENOUS at 00:37

## 2019-01-01 RX ADMIN — PROPOFOL 65 MCG/KG/MIN: 10 INJECTION, EMULSION INTRAVENOUS at 12:03

## 2019-01-01 RX ADMIN — SODIUM CHLORIDE 500 MG: 9 INJECTION, SOLUTION INTRAVENOUS at 17:24

## 2019-01-01 RX ADMIN — PROPOFOL 60 MCG/KG/MIN: 10 INJECTION, EMULSION INTRAVENOUS at 01:02

## 2019-01-01 RX ADMIN — SODIUM CHLORIDE: 9 INJECTION, SOLUTION INTRAVENOUS at 08:40

## 2019-01-01 RX ADMIN — MORPHINE SULFATE 10 MG: 10 INJECTION INTRAVENOUS at 22:43

## 2019-01-01 RX ADMIN — FENTANYL CITRATE 50 MCG: 50 INJECTION INTRAMUSCULAR; INTRAVENOUS at 10:28

## 2019-01-01 RX ADMIN — VECURONIUM BROMIDE 10 MG: 10 INJECTION, POWDER, LYOPHILIZED, FOR SOLUTION INTRAVENOUS at 19:55

## 2019-01-01 RX ADMIN — MORPHINE SULFATE 10 MG: 10 INJECTION INTRAVENOUS at 19:16

## 2019-01-01 RX ADMIN — DOCUSATE SODIUM 100 MG: 50 LIQUID ORAL at 05:01

## 2019-01-01 RX ADMIN — LORAZEPAM 1 MG: 2 INJECTION INTRAMUSCULAR; INTRAVENOUS at 16:07

## 2019-01-01 RX ADMIN — DONEPEZIL HYDROCHLORIDE 10 MG: 5 TABLET, FILM COATED ORAL at 17:24

## 2019-01-01 RX ADMIN — BUSPIRONE HYDROCHLORIDE 7.5 MG: 5 TABLET ORAL at 21:32

## 2019-01-01 RX ADMIN — MORPHINE SULFATE 10 MG: 10 INJECTION INTRAVENOUS at 20:22

## 2019-01-01 RX ADMIN — FAMOTIDINE 20 MG: 20 TABLET ORAL at 05:01

## 2019-01-01 RX ADMIN — LORAZEPAM 1 MG: 2 INJECTION INTRAMUSCULAR; INTRAVENOUS at 19:16

## 2019-01-01 RX ADMIN — CITALOPRAM HYDROBROMIDE 20 MG: 20 TABLET ORAL at 05:01

## 2019-01-01 RX ADMIN — PROPOFOL 65 MCG/KG/MIN: 10 INJECTION, EMULSION INTRAVENOUS at 05:20

## 2019-01-01 RX ADMIN — LORAZEPAM 1 MG: 2 INJECTION INTRAMUSCULAR; INTRAVENOUS at 17:14

## 2019-01-01 RX ADMIN — MORPHINE SULFATE 10 MG: 10 INJECTION INTRAVENOUS at 02:11

## 2019-01-01 RX ADMIN — SIMVASTATIN 20 MG: 20 TABLET, FILM COATED ORAL at 17:24

## 2019-01-01 RX ADMIN — ONDANSETRON 4 MG: 2 INJECTION INTRAMUSCULAR; INTRAVENOUS at 17:17

## 2019-01-01 ASSESSMENT — LIFESTYLE VARIABLES: EVER_SMOKED: UNABLE TO EVALUATE AT THIS TIME - NEEDS ASSESSMENT PRIOR TO DISCHARGE

## 2019-03-29 PROBLEM — D69.1 PLATELET DYSFUNCTION DUE TO ASPIRIN (HCC): Status: ACTIVE | Noted: 2019-01-01

## 2019-03-29 PROBLEM — I62.9 INTRACRANIAL BLEED (HCC): Status: ACTIVE | Noted: 2019-01-01

## 2019-03-29 PROBLEM — T39.015A PLATELET DYSFUNCTION DUE TO ASPIRIN (HCC): Status: ACTIVE | Noted: 2019-01-01

## 2019-03-29 PROBLEM — J96.90 RESPIRATORY FAILURE FOLLOWING TRAUMA (HCC): Status: ACTIVE | Noted: 2019-01-01

## 2019-03-29 PROBLEM — H57.02 ANISOCORIA: Status: ACTIVE | Noted: 2019-01-01

## 2019-03-29 PROBLEM — J95.821 ACUTE RESPIRATORY FAILURE FOLLOWING TRAUMA AND SURGERY (HCC): Status: ACTIVE | Noted: 2019-01-01

## 2019-03-29 PROBLEM — Z53.09 CONTRAINDICATION TO ANTICOAGULATION THERAPY: Status: ACTIVE | Noted: 2019-01-01

## 2019-03-29 PROBLEM — Z01.89 ENCOUNTER FOR GERIATRIC ASSESSMENT: Status: ACTIVE | Noted: 2019-01-01

## 2019-03-29 PROBLEM — S02.113A FRACTURE OF OCCIPITAL CONDYLE (HCC): Status: ACTIVE | Noted: 2019-01-01

## 2019-03-29 NOTE — ASSESSMENT & PLAN NOTE
Areas of mild subarachnoid hemorrhage are noted involving the right and left upper lobes, with more substantial subarachnoid hemorrhage involving the right and left frontal lobes.  Interval follow up head CT with evolving bilateral frontal and temporal hemorrhagic contusions, more apparent than prior exam and stable bilateral subarachnoid and subdural hemorrhage  Prophylactic Keppra x 7 days  Cognitive eval when appropriate   Non operative management  Danyel Jeong MD. Neurosurgery.

## 2019-03-29 NOTE — PROGRESS NOTES
[facility] to [facility] at 1540 accompanied by ACLS RN and CCT on transport monitor via [facility]    HR 96  /56  O2 sat: 97%, room air  RR 18  Temp: 97.7f  Weight: 87.7 kg

## 2019-03-29 NOTE — CONSULTS
"[facility] Geriatric Consult.   Patient Name: Karla Warner  Patient Age, Gender: 78 y.o., female  Date of Consult:3/29/2019      Name of Requesting Consultant(s): No att. providers found  Consultant: Cesario Templeton M.D.  Reason for Consult: Delirium in a geriatric patient    Chief Complaint: Transfer from [facility] due to a ground-level fall    History of Present Illness:  Karla is a 78 y.o. female with past medical history of dementia, hyperlipidemia, hypertension, chronic headaches hypothyroidism insulin-dependent type 2 diabetes transferred to [facility] after suffering a ground-level fall.  History is obtained from medical chart medical staff.  Patient is unable to provide a history today.  She does not appear to respond to my words though is able to move all 4 extremities and look around.  She not appear in distress.  She preferred to be covered with the sheets and that appeared to be her main preference.  Attempting to answer questions with writing did not suggest she was able to attend, or visualize the words on the page.    Upon review of records it appears the patient was recently admitted and discharged for urosepsis.  Chart review also stressed the patient recently had a POLST filled out, unfortunately the [address] is difficult to interpret as it says that she would want CPR, comfort measures only, and no tube feeding.    Her current fractures appear to include fracture of the occiput, intracranial bleed.  It is worth noting that both of her eyes are not equal and are sluggish to response.    CT of the C, L and T-spine were unremarkable.  Her head CT had multiple abnormalities including mandibular fractures, subarachnoid hemorrhage, frontoparietal epidural hematoma    Nursing says she has had some choice phrases like \"I am cold\" or \"that hurts\".  She has not responded to any but he is verbal questions or history.    ROS: Unable to obtain meaningful review of systems as " patient is non-verbal to me  Past Medical History:   Diagnosis Date   • Arthritis     Generalized   • Cancer (HCC)     Skin CA on face   • Chronic cough    • Chronic headaches    • Chronic pain     Dr. Morales following   • Heart burn    • High cholesterol    • History of mammogram 05/29/12    Category 2, benign   • Hyperlipidemia    • Hypertension    • Hypoxia 04/11/2012    referred to Dr Guzman   • Memory loss     Dr Cadena following   • Morbid obesity (HCC)    • Other specified disorder of intestines     Constipation and diarrhea   • Psychiatric problem     sometimes- anxiety and panic attacks   • Type 2 diabetes mellitus (HCC)     on insulin   • Unspecified cataract    • Unspecified hypothyroidism    • Unspecified urinary incontinence        Current Facility-Administered Medications:   •  Respiratory Care per Protocol, , Nebulization, Continuous RT, Carmen JONES Eduar, A.P.N.  •  Pharmacy Consult Request ...Pain Management Review 1 Each, 1 Each, Other, PHARMACY TO DOSE, Carmen K Eduar, A.P.N.  •  docusate sodium (COLACE) capsule 100 mg, 100 mg, Oral, BID, Carmen K Eduar, A.P.N.  •  senna-docusate (PERICOLACE or SENOKOT S) 8.6-50 MG per tablet 1 Tab, 1 Tab, Oral, Nightly, Carmen K Eduar, A.P.N.  •  senna-docusate (PERICOLACE or SENOKOT S) 8.6-50 MG per tablet 1 Tab, 1 Tab, Oral, Q24HRS PRN, Carmen K Eduar, A.P.N.  •  polyethylene glycol/lytes (MIRALAX) PACKET 1 Packet, 1 Packet, Oral, BID, Carmen K Eduar, A.P.N.  •  [START ON 3/30/2019] magnesium hydroxide (MILK OF MAGNESIA) suspension 30 mL, 30 mL, Oral, DAILY, Carmen K Eduar, A.P.N.  •  bisacodyl (DULCOLAX) suppository 10 mg, 10 mg, Rectal, Q24HRS PRN, Carmen K Eduar, A.P.N.  •  fleet enema 133 mL, 1 Each, Rectal, Once PRN, Carmen K Eduar, A.P.N.  •  NS infusion, , Intravenous, Continuous, Carmen K Eduar, A.P.N., Last Rate: 100 mL/hr at 03/29/19 1600  •  oxyCODONE immediate-release (ROXICODONE) tablet 5 mg, 5 mg, Oral, Q3HRS PRN, Carmen Witt, A.P.N.  •   oxyCODONE immediate-release (ROXICODONE) tablet 10 mg, 10 mg, Oral, Q3HRS PRN, Carmen Dalalrman, A.P.N.  •  fentaNYL (SUBLIMAZE) injection 25 mcg, 25 mcg, Intravenous, Q3HRS PRN, Carmen Dalalrman, A.P.N.  •  [START ON 3/30/2019] famotidine (PEPCID) tablet 20 mg, 20 mg, Oral, Q24HRS **OR** [START ON 3/30/2019] famotidine (PEPCID) injection 20 mg, 20 mg, Intravenous, Q24HRS, Carmen Dalalrman, A.P.N.  •  ondansetron (ZOFRAN) syringe/vial injection 4 mg, 4 mg, Intravenous, Q4HRS PRN, Carmen Dalalrman, A.P.N.  •  levETIRAcetam (KEPPRA) 500 mg in  mL IVPB, 500 mg, Intravenous, BID, Carmen Dalalrman, A.P.N.  •  insulin regular (HUMULIN R) injection 3-14 Units, 3-14 Units, Subcutaneous, Q6HRS **AND** Accu-Chek Q6 if NPO, , , Q6H **AND** NOTIFY MD and PharmD, , , Once **AND** glucose 4 g chewable tablet 16 g, 16 g, Oral, Q15 MIN PRN **AND** dextrose 50% (D50W) injection 25 mL, 25 mL, Intravenous, Q15 MIN PRN, Carmen Witt, A.P.N.  •  simvastatin (ZOCOR) tablet 20 mg, 20 mg, Oral, Q EVENING, Carmen Dalalrman, A.P.N.  •  losartan (COZAAR) tablet 100 mg, 100 mg, Oral, DAILY, Carmen Witt, A.P.N.  •  haloperidol lactate (HALDOL) injection 1 mg, 1 mg, Intramuscular, Q4HRS PRN, Carmen Dalalrman, A.P.N.  Social History     Social History   • Marital status:      Spouse name: N/A   • Number of children: N/A   • Years of education: N/A     Occupational History   • Not on file.     Social History Main Topics   • Smoking status: Never Smoker   • Smokeless tobacco: Never Used   • Alcohol use 0.0 oz/week      Comment: rarely   • Drug use: No   • Sexual activity: Not on file     Other Topics Concern   • Not on file     Social History Narrative   • No narrative on file     Family History   Problem Relation Age of Onset   • Diabetes Mother    • Heart Attack Mother 75   • Cancer Father 66        colon     Past Surgical History:   Procedure Laterality Date   • CATARACT PHACO WITH IOL Left 9/15/2015    Procedure: CATARACT PHACO  "WITH IOL - IOL EXCHANGE;  Surgeon: Roberto Carlos Wheeler M.D.;  [facility];  Service:    • VITRECTOMY ANTERIOR  9/15/2015    Procedure: VITRECTOMY ANTERIOR - WITH PHACO MACHINE;  Surgeon: Roberto Carlos Wheeler M.D.;  Location[facility];  Service:    • ODALYS BY LAPAROSCOPY  02/20/12    [facility], DR Gray, cholelithiasis   • BUNIONECTOMY  03/20/09   • CATARACT EXTRACTION WITH IOL  2008   • HIATAL HERNIA REPAIR  09/24/07   • KNEE ARTHROSCOPY  2006    lf, bone spur   • LESION EXCISION GENERAL  2005    lip,type unk   • LUMBAR FUSION ANTERIOR  2001    unk   • GASTRIC BANDING LAPAROSCOPIC     • ROTATOR CUFF REPAIR      rt         Physical Exam:  /66   Pulse 97   Temp 36.5 °C (97.7 °F) (Temporal)   Resp 18   Ht 1.676 m (5' 6\")   Wt 87.7 kg (193 lb 5.5 oz)   SpO2 98%   BMI 31.21 kg/m²   General: No acute distress, laying in bed avoiding eye contact not responsive to loud voice, easily arousable and alert to light touch appear to track me but did not appear very interested  Cardiovascular: Regular rate and rhythm 2+ radial pulses bilaterally 1+ DP pulses bilateral  Lungs: Normal effort, clear to auscultation anteriorly  Abdomen: Patient did not let me look at her stomach, however it appears soft nontender nondistended  Delirium screen: positive based on altered level consciousness, fluctuating mental status and disorganized thinking   Vision Screen: Patient appears to have some vision though her inability to communicate with words makes my ability to assess her vision difficult  Hearing Screen: Patient cannot does not appear to be able to hear any  Neuro: Able to move all fortunately spontaneously left eye pinpoint right eye 2-3 mm, patient appears to dislike me shining a light in her eye but both appeared reactive    Labs: White count of 17.1, hemoglobin 11.2, sodium 133 anion gap 15 creatinine 1.45, lactic acid of 4.3  Imaging: CT head, CT L, T, C-spine      Assessment: 78-year-old " female admitted after falling down having multiple intracranial injuries      Plan:  Fall at home  Multiple intracranial injuries  Temporal bone fracture on the right  Subarachnoid hemorrhage, traumatic right and left frontal lobes  Epidural hematoma posterior right frontal and posterior right parietal lobes  -Management per trauma neurosurgery  -On Keppra for seizure prophylaxis  -Query as to whether temporal bone fractures are the etiology of her hearing loss  -Consider sepsis, UTI is potential source of fall    Lactic acidosis  Anion gap metabolic acid  -Treatment per trauma    History of dementia  Patient does not have a history of stroke, per chart review  -Alzheimer's would be most likely etiology  -She is on Namenda and donepezil at home  -It seems reasonable to hold these for now  -Consider resuming at half home dose if patient is able to take p.o. Medication, monitor for bradycardia and stomach upset if these medications are resumed     History of hypothyroid  -Most recent thyroid check about 1 month ago suggested the patient is being over treated, high T4  -We will recheck TSH/T4  -resume levothyroxine when able, pending above TSH check    Delirium without behavior disturbance  -Likely related to the above intracranial abnormalities   -The fact that she appears to be unable to hear is currently will increase risk for delirium   -Patient has multiple risk factors for worsening delirium while she is here  -Would recommend scheduling Tylenol for pain  -Minimizing use of narcotic to the lowest, most efficacious dose  -See below for resumption of home medication recommendation  -With the avoid the use of benzodiazepines, including patient's home zolpidem  -Consider repeating EKG  -Recommend changing Haldol to 1 mg IM, patient gets violent towards herself or others    Type 2 diabetes  -Metformin and insulin at home  -Sliding scale insulin per trauma  -Recent A1c 6.2%, patient's goal A1c is about 8.0%  -Given  patient's CKD at baseline and recent hospitalizations continued use of metformin may not be in the patient's best interest    Hypertension  -On losartan 100 mg at home    History of neurogenic bladder/bladder  -On oxybutynin 5 mg 3 times daily at home  -This medication can cause delirium in older adult  -However bladder spasm can also cause worsening delirium and confusion hospitalized older patient  -If patient gets delirium would consider this as an etiology and continue resuming at half normal home dose    Polypharmacy  -Patient is on multiple medications at home.  -Would not recommend resuming zolpidem or tramadol while in house  -Flexeril I would use with caution    Anxiety  Depression  -Patient is able to take oral pills and appears to be suffering from anxiety consider restarting her BuSpar and home Celexa  -Avoid benzodiazepines    CKD stage IV-V  -Appears at baseline  -Patient with recent urosepsis discharge 2/28/2019  -Consider repeating UA if any signs or symptoms of sepsis start to occur, noted lactic acid above    Interventions to be considered in all patients in order to minimize the risk of delirium.   -do not disturb patient (vitals or lab draws) between the hours of 10 PM and 6 AM.  -ideally the patient should not sleep during the day and we should avoid day time naps.   -up in chair for meals  -ambulate at least three times daily, as able  -watch for constipation  -timed voiding - ask patient is she would like to go to the bathroom q 2-3 hours, except during the do not disturb hours.   -remove all necessary lines (central lines, peripheral IVs, feeding tubes, whitaker catheters)  -unless patient has shown harm to self or others I would recommend against use of restraints - either chemical or physical (antipsychotics)   -minimize polypharmacy, do not dose medication during sleep hours    Disposition  -Prognosis quite guarded  -Would recommend family discussion regarding treatment plan given the unusual  POLST and the difficulty treating her without her ability to place an NG tube with her delirium at this point in potential need for oral medications and nutrition     we will continue to follow that patient along with you on Monday if the patient is still admitted.   Unfortunately we do not have weekend or night time coverage.      Cesario Templeton M.D.  Geriatric Attending  [facility]  Available Monday-Friday 8:00-5:00 (excudling holidays)    This note was partially dictated with voice recognition software, for any confusion please do not hesitate to contact me.       Direct Links to Patient Handouts:    CDC Healthy Aging  NIH National Bells on Aging  [facility] Patient Resources    Links that can be Cut and Paste into your browser:    Healthy Aging  www.healthinaging.org  Staying Active  www.activeandhealthy.nsw.gov.au  Geriatrics Online   https://geriatricscareonline.org/ProductAbstract/ags-patient-handouts/[email or web address]

## 2019-03-29 NOTE — PROGRESS NOTES
2 RN skin check complete:    Patient's posterior head red, tender. Photo taken.  No breakdown on sacrum, mepilex applied for preventative purposes.  Generalized bruising on all extremities.  R lower lip lac.  Heels pink and blanching.  Abrasions on L ankle.

## 2019-03-29 NOTE — DISCHARGE PLANNING
Social Work Student    Social work student responded to trauma red tx from [facility]. Pt brought in by care flight. Pt fell down the stairs at [facility]. Social work student obtained copy of POLST, already on file. Per care flight pt daughter is on her way to the hospital.    Reviewed by AMINATA Plasencia

## 2019-03-29 NOTE — ED PROVIDER NOTES
ED Provider Note    Scribed for Dr. Herrera David M.D. by Karlee Coates. 3/29/2019  3:12 PM    Primary care provider: Bhupinder Juares M.D.  Means of arrival: EMS  History obtained from: EMS  History limited by: patient's altered mental status    CHIEF COMPLAINT  Trauma Red Transfer     HPI  Karla Warner is a 78 y.o. female with a history of diabetes and hypertension who presents to the Emergency Department via care flight as a transfer from [facility] for evaluation of head trauma secondary to a ground level fall today. Per EMS, the patient was walking up the stairs at her care facility when she accidentally fell backwards, striking the back of her head. The patient sustained an associated hematoma to the back of her head. She does take Aspirin daily. The patient received 4 mg of Zofran PTA.     CT at the transferring facility showed an epidural hemorrhage, multiple subarachnoid hemorrhages, and a skull fracture, prompting transfer to the ED for a neurosurgery consult/ICU admission.     Further HPI is limited secondary to the patient's altered mental status.     REVIEW OF SYSTEMS  Pertinent positives include hematoma to the back of her head, altered mental status.   See HPI for further details.     Further HPI is limited secondary to the patient's altered mental status.     PAST MEDICAL HISTORY   has a past medical history of Arthritis; Cancer (HCC); Chronic cough; Chronic headaches; Chronic pain; Heart burn; High cholesterol; History of mammogram (05/29/12); Hyperlipidemia; Hypertension; Hypoxia (04/11/2012); Memory loss; Morbid obesity (HCC); Other specified disorder of intestines; Psychiatric problem; Type 2 diabetes mellitus (HCC); Unspecified cataract; Unspecified hypothyroidism; and Unspecified urinary incontinence.    SURGICAL HISTORY   has a past surgical history that includes rotator cuff repair; gastric banding laparoscopic; lumbar fusion anterior (2001); bunionectomy  "(03/20/09); cataract extraction with iol (2008); lesion excision general (2005); hiatal hernia repair (09/24/07); knee arthroscopy (2006); marilu by laparoscopy (02/20/12); cataract phaco with iol (Left, 9/15/2015); and vitrectomy anterior (9/15/2015).    SOCIAL HISTORY  Social History   Substance Use Topics   • Smoking status: Never Smoker   • Smokeless tobacco: Never Used   • Alcohol use 0.0 oz/week      Comment: rarely      History   Drug Use No       FAMILY HISTORY  Family History   Problem Relation Age of Onset   • Diabetes Mother    • Heart Attack Mother 75   • Cancer Father 66        colon       CURRENT MEDICATIONS  Current medications can be reviewed in the nurse's note.     ALLERGIES  Allergies   Allergen Reactions   • Doxycycline      Passed out   • Macrobid [Nitrofurantoin]      \"Blacked out\" memory loss, N/V/D, almost passed out   • Pcn [Penicillins] Rash     Breaks out in rash       PHYSICAL EXAM  VITAL SIGNS: /61   Pulse (!) 104   Temp (!) 35.8 °C (96.4 °F) (Temporal)   Resp 16   Ht 1.676 m (5' 6\")   Wt 90.7 kg (200 lb)   SpO2 97%   BMI 32.28 kg/m²     Constitutional: Well developed, Well nourished, mild distress, thrashing around  HENT: Normocephalic, Hematoma noted to the back of the head, Bilateral external ears normal, Oropharynx moist, No oral exudates.   Eyes: Post surgical pupil on the left secondary to cataract surgery. EOMI.   Neck: No tenderness, Supple, No stridor.   Lymphatic: No lymphadenopathy noted.   Cardiovascular: Normal heart rate, Normal rhythm.   Thorax & Lungs: Clear to auscultation bilaterally, No respiratory distress, No wheezing, No crackles.   Abdomen: Soft, No tenderness, No masses, No pulsatile masses.   Skin: Warm, Dry, No erythema, No rash.   Extremities:, No edema. No cyanosis.   Musculoskeletal: Intact distal pulses.   Neurologic: Awake, not following commands, appears confused. Moves all extremities spontaneously.  Psychiatric: Unable to assess secondary to " the patient's altered mental status.     RADIOLOGY  DX-CHEST-LIMITED (1 VIEW)   Final Result      1.  Mildly enlarged cardiac silhouette with probable vascular congestion.   2.  Hazy right lobe opacity could be atelectasis.      OUTSIDE IMAGES-DX CHEST   Final Result      OUTSIDE IMAGES-CT THORACIC SPINE   Final Result      OUTSIDE IMAGES-CT LUMBAR SPINE   Final Result      OUTSIDE IMAGES-CT HEAD   Final Result      OUTSIDE IMAGES-CT CERVICAL SPINE   Final Result      CT-HEAD W/O    (Results Pending)   US-TRAUMA VEIN SCREEN LOWER BILAT EXTREMITY    (Results Pending)   DX-CHEST-LIMITED (1 VIEW)    (Results Pending)     The radiologist's interpretation of all radiological studies have been reviewed by me.    COURSE & MEDICAL DECISION MAKING  Pertinent Labs & Imaging studies reviewed. (See chart for details)    3:12 PM - Patient seen and examined at bedside. Ordered DX pelvis, DX chest to evaluate her symptoms. The differential diagnoses include but are not limited to: known epidural hemorrhage, multiple subarachnoid hemorrhages, and skull fracture.      3:15 PM- Spoke with Dr. Gonsalez (trauma surgery) who kindly agrees to admit the patient to the ICU.     Decision Making:  Patient with apparent epidural hematoma as well as subarachnoid hemorrhage will be admitted to ICU with neurosurgical consultation    DISPOSITION:  Patient will be admitted to Dr. Gonsalez (trauma surgery) in critical condition.    FINAL IMPRESSION  Epidural hematoma  Subarachnoid hemorrhage     IKarlee (Scribe), am scribing for, and in the presence of, Herrera David M.D..    Electronically signed by: Karlee Coates (Scribe), 3/29/2019    Herrera BLOOD M.D. personally performed the services described in this documentation, as scribed by Karlee Coates in my presence, and it is both accurate and complete. C.     The note accurately reflects work and decisions made by me.  Herrera David  3/29/2019  7:10 PM

## 2019-03-29 NOTE — H&P
Trauma History and Physical  3/29/2019    Attending Physician: Geo Gonsalez MD.     CC: Trauma The patient was triaged as a Trauma Red Transfer in accordance with established pre hospital protocols. An expeditious primary and secondary survey with required adjuncts was conducted. See trauma narrator for full details.    HPI (obtained from flight crew and : This is a 78 year old female with multiple medical problems who reportedly fell backwards down a few stairs this afternoon, striking her head on the ground. She was taken to a local hospital in [place] where CT imaging revealed multiple small areas of traumatic brain injury. She reportedly takes aspirin and was transferred to [facility] for further care as a trauma red transfer. She was a bit agitated en route and not speaking.     Past Medical History:   Diagnosis Date   • Arthritis     Generalized   • Cancer (HCC)     Skin CA on face   • Chronic cough    • Chronic headaches    • Chronic pain     Dr. Morales following   • Heart burn    • High cholesterol    • History of mammogram 05/29/12    Category 2, benign   • Hyperlipidemia    • Hypertension    • Hypoxia 04/11/2012    referred to Dr Guzman   • Memory loss     Dr Cadena following   • Morbid obesity (HCC)    • Other specified disorder of intestines     Constipation and diarrhea   • Psychiatric problem     sometimes- anxiety and panic attacks   • Type 2 diabetes mellitus (HCC)     on insulin   • Unspecified cataract    • Unspecified hypothyroidism    • Unspecified urinary incontinence        Past Surgical History:   Procedure Laterality Date   • CATARACT PHACO WITH IOL Left 9/15/2015    Procedure: CATARACT PHACO WITH IOL - IOL EXCHANGE;  Surgeon: Roberto Carlos Wheeler M.D.;  Location: SURGERY SURGICAL [facility];  Service:    • VITRECTOMY ANTERIOR  9/15/2015    Procedure: VITRECTOMY ANTERIOR - WITH PHACO MACHINE;  Surgeon: Roberto Carlos Wheeler M.D.;  Location: SURGERY SURGICAL [facility];  Service:    • ODALYS BY LAPAROSCOPY   02/20/12    [facility] [place], DR Gray, cholelithiasis   • BUNIONECTOMY  03/20/09   • CATARACT EXTRACTION WITH IOL  2008   • HIATAL HERNIA REPAIR  09/24/07   • KNEE ARTHROSCOPY  2006    lf, bone spur   • LESION EXCISION GENERAL  2005    lip,type unk   • LUMBAR FUSION ANTERIOR  2001    unk   • GASTRIC BANDING LAPAROSCOPIC     • ROTATOR CUFF REPAIR      rt       Current Facility-Administered Medications   Medication Dose Route Frequency Provider Last Rate Last Dose   • Respiratory Care per Protocol   Nebulization Continuous RT Carmen Dalalrman, A.P.N.       • Pharmacy Consult Request ...Pain Management Review 1 Each  1 Each Other PHARMACY TO DOSE Carmen JONES Eduar, A.P.N.       • docusate sodium (COLACE) capsule 100 mg  100 mg Oral BID Carmen JONES Eduar, A.P.N.   Stopped at 03/29/19 1800   • senna-docusate (PERICOLACE or SENOKOT S) 8.6-50 MG per tablet 1 Tab  1 Tab Oral Nightly Carmen JONES Eduar, A.P.N.       • senna-docusate (PERICOLACE or SENOKOT S) 8.6-50 MG per tablet 1 Tab  1 Tab Oral Q24HRS PRN Carmen JONES Eduar, A.P.N.       • polyethylene glycol/lytes (MIRALAX) PACKET 1 Packet  1 Packet Oral BID Carmen JONES Eduar, A.P.N.   Stopped at 03/29/19 1800   • [START ON 3/30/2019] magnesium hydroxide (MILK OF MAGNESIA) suspension 30 mL  30 mL Oral DAILY Carmen Dalalrman, A.P.N.       • bisacodyl (DULCOLAX) suppository 10 mg  10 mg Rectal Q24HRS PRN Carmen K Eduar, A.P.N.       • fleet enema 133 mL  1 Each Rectal Once PRN Carmen JONES Eduar, A.P.N.       • NS infusion   Intravenous Continuous Carmen JONES Eduar, A.P.N. 100 mL/hr at 03/29/19 1600     • oxyCODONE immediate-release (ROXICODONE) tablet 5 mg  5 mg Oral Q3HRS PRN Carmen JONES Eduar, A.P.N.       • oxyCODONE immediate-release (ROXICODONE) tablet 10 mg  10 mg Oral Q3HRS PRN Carmen Witt, A.P.N.       • fentaNYL (SUBLIMAZE) injection 25 mcg  25 mcg Intravenous Q3HRS PRN Carmen Witt, A.P.N.       • [START ON 3/30/2019] famotidine (PEPCID) tablet 20 mg  20 mg Oral Q24HRS Carmen Witt,  A.P.N.        Or   • [START ON 3/30/2019] famotidine (PEPCID) injection 20 mg  20 mg Intravenous Q24HRS Carmen Dalalrman, A.P.N.       • ondansetron (ZOFRAN) syringe/vial injection 4 mg  4 mg Intravenous Q4HRS PRN Carmen Dalalrman, A.P.N.   4 mg at 03/29/19 1717   • levETIRAcetam (KEPPRA) 500 mg in  mL IVPB  500 mg Intravenous BID Carmen Dalalrman, A.P.N. 400 mL/hr at 03/29/19 1726 500 mg at 03/29/19 1726   • insulin regular (HUMULIN R) injection 3-14 Units  3-14 Units Subcutaneous Q6HRS Carmen Dalalrman, A.P.N.   3 Units at 03/29/19 1721    And   • glucose 4 g chewable tablet 16 g  16 g Oral Q15 MIN PRN Carmen Dalalrman, A.P.N.        And   • dextrose 50% (D50W) injection 25 mL  25 mL Intravenous Q15 MIN PRN Carmen Dalalrman, A.P.N.       • simvastatin (ZOCOR) tablet 20 mg  20 mg Oral Q EVENING Carmen Dalalrman, A.P.N.   Stopped at 03/29/19 1800   • losartan (COZAAR) tablet 100 mg  100 mg Oral DAILY Carmen Dalalrman, A.P.N.   Stopped at 03/29/19 1630   • haloperidol lactate (HALDOL) injection 1 mg  1 mg Intramuscular Q4HRS PRN Carmen Dalalrman, A.P.N.           Social History     Social History   • Marital status:      Spouse name: N/A   • Number of children: N/A   • Years of education: N/A     Occupational History   • Not on file.     Social History Main Topics   • Smoking status: Never Smoker   • Smokeless tobacco: Never Used   • Alcohol use 0.0 oz/week      Comment: rarely   • Drug use: No   • Sexual activity: Not on file     Other Topics Concern   • Not on file     Social History Narrative   • No narrative on file       Family History   Problem Relation Age of Onset   • Diabetes Mother    • Heart Attack Mother 75   • Cancer Father 66        colon       Allergies:  Doxycycline; Macrobid [nitrofurantoin]; and Pcn [penicillins]    Review of Systems:  Unable to assess - not answering questions    Physical Exam:  Blood pressure 147/66, pulse 97, temperature 36.4 °C (97.6 °F), temperature source Temporal, resp.  "rate (!) 39, height 1.676 m (5' 6\"), weight 87.7 kg (193 lb 5.5 oz), SpO2 94 %, not currently breastfeeding.    Constitutional: Awake, alert, oriented x 0. Not answering questions. No acute distress. GCS 10. E4 V1 M5.  Head: No cephalohematoma Posterior head erythema. Pupils 4-3 reactive bilaterally. Midface stable. No malocclusion.    Neck: No tracheal deviation. No midline cervical spine tenderness.  No cervical seatbelt sign.  Cardiovascular: Normal rate, regular rhythm, normal heart sounds and intact distal pulses.  Exam reveals no gallop and no friction rub.  No murmur heard.  Pulmonary/Chest: Clavicles nontender to palpation. There is not any chest wall tenderness bilaterally.  No crepitus. Positive breath sounds bilaterally.   Abdominal: Soft, nondistended. Nontender to palpation. Pelvis is stable to anterior-posterior compression. No abdominal seatbelt sign.   Musculoskeletal: Right upper extremity grossly atraumatic, palpable radial pulse. 5/5  strength. Full ROM and strength at elbow.  Left upper extremity grossly atraumatic, palpable radial pulse. 5/5  strength. Full ROM and strength at elbow.  Right lower extremity grossly atraumatic. 5/5 strength in ankle plantar flexion and dorsiflexion. No pain and full ROM at right knee and hip. 2+ DP pulse.  Left  lower extremity grossly atraumatic. 5/5 strength in ankle plantar flexion and dorsiflexion. No pain and full ROM at left knee and hip. 2+ DP pulse.  Back: Midline thoracic and lumbar spines are nontender to palpation. No step-offs. Mild sacral erythema present.  : Normal female external genitalia. Rectal exam not done. No blood visible at urethral meatus.   Neurological: Sensation grossly intact to light touch dorsum and plantar surfaces of both feet and the medial and lateral aspects of both lower legs.  Sensation grossly intact to light touch dorsum and plantar surfaces of both hands.   Skin: Skin is warm and dry.  No diaphoresis. No erythema. " No pallor.   Psychiatric:  Impulsive.  Labs:  Recent Labs      03/29/19   1336  03/29/19   1521   WBC  7.3  17.1*   RBC  3.84*  3.72*   HEMOGLOBIN  11.4*  11.2*   HEMATOCRIT  34.9*  34.5*   MCV  90.9  92.7   MCH  29.7  30.1   MCHC  32.7*  32.5*   RDW  14.6*  49.6   PLATELETCT  221  222   MPV  11.4*  11.4     Recent Labs      03/29/19   1336  03/29/19   1521   SODIUM  134*  133*   POTASSIUM  4.8  4.9   CHLORIDE  99  101   CO2  21  17*   GLUCOSE  98  142*   BUN  24*  23*   CREATININE  1.7*  1.45*   CALCIUM  10.9  10.5     Recent Labs      03/29/19   1336  03/29/19   1521   APTT   --   29.4   INR  1.01  1.06     Recent Labs      03/29/19   1336  03/29/19   1521   ASTSGOT   --   25   ALTSGPT   --   13   TBILIRUBIN   --   0.6   ALKPHOSPHAT   --   74   GLOBULIN   --   3.0   INR  1.01  1.06         Radiology:  DX-CHEST-LIMITED (1 VIEW)   Final Result      1.  Mildly enlarged cardiac silhouette with probable vascular congestion.   2.  Hazy right lobe opacity could be atelectasis.      OUTSIDE IMAGES-DX CHEST   Final Result      OUTSIDE IMAGES-CT THORACIC SPINE   Final Result      OUTSIDE IMAGES-CT LUMBAR SPINE   Final Result      OUTSIDE IMAGES-CT HEAD   Final Result      OUTSIDE IMAGES-CT CERVICAL SPINE   Final Result      CT-HEAD W/O    (Results Pending)   US-TRAUMA VEIN SCREEN LOWER BILAT EXTREMITY    (Results Pending)         Assessment: This is a 78 y.o. Female with multifocal traumatic intracranial hemorrhages and a skull fracture after a fall backwards down stairs. I called Dr. Jeong for evaluation at 1552 hrs.   Per report, her normal GCS is 15 and she went out to dinner last night with her friends    Plan: Admit to ICU. Platelet mapping showed significant AA inhibition. Will transfuse 1 U platelets and repeat TEG    Platelet dysfunction due to aspirin (HCC)   Assessment & Plan    Admission AA inhibition > 90%  Transfuse 1 U platelets and repeat platelet mapping TEG     Fracture of occipital condyle (HCC)-  (present on admission)   Assessment & Plan    Nondisplaced stellate fracture nondisplaced oblique fracture involving the occipital bone, with dominant fracture lines extending anteriorly, to involve both  temporal bones, terminating within the temporal mandibular joints.  Non-operative management.  Danyel Jeong MD. Neurosurgery.     Intracranial bleed (HCC)- (present on admission)   Assessment & Plan    Areas of mild subarachnoid hemorrhage are noted involving the right and left upper lobes, with more substantial subarachnoid hemorrhage involving the right and left frontal lobes.  Interval follow up head CT pending 1900  [facility]  Cog eval when appropriate   Definitive plan pending.  Danyel Jeong MD. Neurosurgery.     Type 2 diabetes mellitus without complication (HCC)- (present on admission)   Assessment & Plan    Sliding scale in ICU      Anisocoria- (present on admission)   Assessment & Plan    History of eye surgery  Unequal pupils on arrival      Contraindication to anticoagulation therapy- (present on admission)   Assessment & Plan    Systemic anticoagulation contraindicated secondary to elevated bleeding risk.  3/29 Surveillance venous duplex scanning ordered.     Encounter for geriatric assessment- (present on admission)   Assessment & Plan    Consult called      Dementia- (present on admission)   Assessment & Plan    Per chart review  RX Aricept and Namenda 11/2018     Hypothyroid- (present on admission)   Assessment & Plan    Chronic condition treated with Synthroid.  Resume maintenance medication when appropriate      Hypertension- (present on admission)   Assessment & Plan    Chronic condition treated with losartan   Resumed maintenance medication      Obesity (BMI 35.0-39.9 without comorbidity) (HCC)- (present on admission)   Assessment & Plan    Remote history of gastric lap banding          The patient is/remains critically ill with traumatic intracranial hemorrhage, coagulopathy.    I provided  the following critical care services: reversal of coagulopathy, initial management of above, communication with consulting physicians.    Critical care time spent exclusive of procedures: 85 minutes thus far.            Geo Gonsalez MD  [facility]  [phone]

## 2019-03-29 NOTE — ASSESSMENT & PLAN NOTE
Nondisplaced stellate fracture nondisplaced oblique fracture involving the occipital bone, with dominant fracture lines extending anteriorly, to involve both  temporal bones, terminating within the temporal mandibular joints.  Non-operative management.  Danyel Jeong MD. Neurosurgery.

## 2019-03-29 NOTE — ED NOTES
Patient BIB [facility], transfer from [place]. Pt is an 78 y.o. F, sustained GLF, fell backwards from stairs onto cement. GCS 13 PTA. CT positive for head bleeds. Received 4 mg zofran en route. Left side greater than right for movement, baseline per EMS.

## 2019-03-29 NOTE — RESPIRATORY CARE
Respiratory Trauma Red Note    Intubation No    No respiratory interventions required at this time. See trauma narrator notes for full details

## 2019-03-29 NOTE — ASSESSMENT & PLAN NOTE
Systemic anticoagulation contraindicated secondary to elevated bleeding risk.  3/29 Surveillance venous duplex scanning ordered.

## 2019-03-29 NOTE — CARE PLAN
Problem: Knowledge Deficit  Goal: Knowledge of disease process/condition, treatment plan, diagnostic tests, and medications will improve  Outcome: PROGRESSING SLOWER THAN EXPECTED  Patient not following at this time.

## 2019-03-30 PROBLEM — E83.42 HYPOMAGNESEMIA: Status: ACTIVE | Noted: 2019-01-01

## 2019-03-30 NOTE — PROGRESS NOTES
"Trauma Progress Note 3/30/2019 12:53 AM    Briefly, this is a 78 y.o. female with intracranial hemorrhage and contusions. The patient required emergent intubation after repeated emesis and aspiration and resulting desaturation. Repeat head CT demonstrating evolving bilateral frontal and temporal hemorrhagic contusions. The patient received one unit of platelets to reverse AA inhibition >90%. Repeat TEG with 85.6% - second unit of platelets ordered.    /73   Pulse 72   Temp 37 °C (98.6 °F) (Temporal)   Resp 18   Ht 1.676 m (5' 6\")   Wt 87.7 kg (193 lb 5.5 oz)   SpO2 100%   BMI 31.21 kg/m²     Hemoglobin: 11.2 g/dL  Hematocrit: 34.5 %    Arterial Blood Gas:  Recent Labs      03/29/19   2303   ISTATAPH  7.345*   ISTATAPCO2  37.7*   ISTATAPO2  94*   ISTATATCO2  22   [id number]  97   ISTATARTHCO3  20.6   ISTATARTBE  -5*   ISTATTEMP  99.9 F   ISTATFIO2  70   ISTATSPEC  Arterial   ISTATAPHTC  7.335*   [id number]  99*         Recent Labs      03/29/19   1336  03/29/19   1521   APTT   --   29.4   INR  1.01  1.06      Recent Labs      03/29/19   1521   REACTMIN  4.5*   CLOTKINET  1.0   CLOTANGL  75.4*   MAXCLOTS  76.9*   [id number]  0.0   PRCINADP  77.3   PRCINAA  94.8     Hypertension  Chronic condition treated with losartan   Resumed maintenance medication     Type 2 diabetes mellitus without complication (HCC)  Sliding scale in ICU     Intracranial bleed (HCC)  Areas of mild subarachnoid hemorrhage are noted involving the right and left upper lobes, with more substantial subarachnoid hemorrhage involving the right and left frontal lobes.  Interval follow up head CT with evolving bilateral frontal and temporal hemorrhagic contusions, more apparent than prior exam and stable bilateral subarachnoid and subdural hemorrhage  Keppra  Cog eval when appropriate   Definitive plan pending.  Danyel Jeong MD. Neurosurgery.    Fracture of occipital condyle (HCC)  Nondisplaced stellate fracture nondisplaced oblique " fracture involving the occipital bone, with dominant fracture lines extending anteriorly, to involve both  temporal bones, terminating within the temporal mandibular joints.  Non-operative management.  Danyel Jeong MD. Neurosurgery.    Dementia  Per chart review  RX Aricept and Namenda 11/2018    Obesity (BMI 35.0-39.9 without comorbidity) ([facility])  Remote history of gastric lap banding     Hypothyroid  Chronic condition treated with Synthroid.  Resume maintenance medication when appropriate     Encounter for geriatric assessment  Consult called     Contraindication to anticoagulation therapy  Systemic anticoagulation contraindicated secondary to elevated bleeding risk.  3/29 Surveillance venous duplex scanning ordered.    Anisocoria  History of eye surgery  Unequal pupils on arrival     Platelet dysfunction due to aspirin ([facility])  Admission AA inhibition > 90%  Transfuse 1 U platelets and repeat platelet mapping TEG    Respiratory failure following trauma ([facility])  Intubated in the ICU  Continue full mechanical ventilatory support. Ventilator bundle and Trauma weaning protocol.    Urine Output: whitaker catheter adequate amount (660 ml)    Assessment: intubated sedated, neuro checks consistent with no following, withdraws on all four extremities.     Additional plans: continue Q1 hr neuro checks

## 2019-03-30 NOTE — CARE PLAN
Problem: Respiratory:  Goal: Respiratory status will improve  Outcome: PROGRESSING SLOWER THAN EXPECTED  Patient apparently aspirated vomit yesterday per bronch view. Collaborating with RT to maintain optimum O2 saturation.

## 2019-03-30 NOTE — ASSESSMENT & PLAN NOTE
Intubated in the ICU  Continue full mechanical ventilatory support.   Ventilator bundle and Trauma weaning protocol.

## 2019-03-30 NOTE — PROGRESS NOTES
1730 - Patient fell asleep, desaturated to the high 80s, placed on 2 L NC.   1830 - Patient threw up, RN assisted to side and suctioned, no cough, patient started to saturations went to 77%, switched to NRB at 15 L.   1840 - Patient continues to saturated in the high 80s on a NRB, called Dr. Parada. Order for stat chest xray. RT placing patient on HFNC 100% and 70 L.   1845 - RN called family, and message left.

## 2019-03-30 NOTE — CONSULTS
Neurosurgery consultation  320 9/19/1730  Reason for referral head injury     Referring physician Dr. Herrera David M.D     Primary care provider: Bhupinder Juares M.D.  Means of arrival: EMS  History obtained from: EMS  History limited by: patient's altered mental status     CHIEF COMPLAINT  Fall     HPI  Karla Warner is a 78 y.o. female who was walking up the stairs at her care facility when she accidentally fell backwards, striking the back of her head. The patient sustained an associated hematoma to the back of her head. She takes Aspirin daily.     CT at the transferring facility showed an epidural hemorrhage, multiple subarachnoid hemorrhages, and a skull fracture, prompting transfer to the ED for a neurosurgery consult/ICU admission.      Further HPI is limited secondary to the patient's altered mental status.      REVIEW OF SYSTEMS  Pertinent positives include hematoma to the back of her head, altered mental status.   See HPI for further details.      Further HPI is limited secondary to the patient's altered mental status.      PAST MEDICAL HISTORY   has a past medical history of Arthritis; Cancer (HCC); Chronic cough; Chronic headaches; Chronic pain; Heart burn; High cholesterol; History of mammogram (05/29/12); Hyperlipidemia; Hypertension; Hypoxia (04/11/2012); Memory loss; Morbid obesity (HCC); Other specified disorder of intestines; Psychiatric problem; Type 2 diabetes mellitus (HCC); Unspecified cataract; Unspecified hypothyroidism; and Unspecified urinary incontinence.     SURGICAL HISTORY   has a past surgical history that includes rotator cuff repair; gastric banding laparoscopic; lumbar fusion anterior (2001); bunionectomy (03/20/09); cataract extraction with iol (2008); lesion excision general (2005); hiatal hernia repair (09/24/07); knee arthroscopy (2006); marilu by laparoscopy (02/20/12); cataract phaco with iol (Left, 9/15/2015); and vitrectomy anterior (9/15/2015).     SOCIAL  "HISTORY  Social History    Substance Use Topics    • Smoking status: Never Smoker    • Smokeless tobacco: Never Used    • Alcohol use 0.0 oz/week         Comment: rarely           History   Drug Use No         FAMILY HISTORY  Family History         Family History   Problem Relation Age of Onset   • Diabetes Mother     • Heart Attack Mother 75   • Cancer Father 66         colon            CURRENT MEDICATIONS  Current medications can be reviewed in the nurse's note.      ALLERGIES        Allergies   Allergen Reactions   • Doxycycline         Passed out   • Macrobid [Nitrofurantoin]         \"Blacked out\" memory loss, N/V/D, almost passed out   • Pcn [Penicillins] Rash       Breaks out in rash         PHYSICAL EXAM  VITAL SIGNS: /61   Pulse (!) 104   Temp (!) 35.8 °C (96.4 °F) (Temporal)   Resp 16   Ht 1.676 m (5' 6\")   Wt 90.7 kg (200 lb)   SpO2 97%   BMI 32.28 kg/m²      Constitutional: Well developed, Well nourished, mild distress, Non-toxic appearance.   HENT: Normocephalic, Hematoma noted to the back of the head, Bilateral external ears normal, Oropharynx moist, No oral exudates.   Eyes: Post surgical pupil on the left secondary to cataract surgery. EOMI.   Neck: No tenderness,   Skin: Warm, Dry, No erythema, No rash.   Extremities:, No edema. No cyanosis.   Musculoskeletal: full range of motion  Neurologic:     Awake, alert   Speech fluent appropriate  In no apparent distress  Affect, mood appropriate  Oriented to situation  Conjugate gaze.  Visual fields full to confrontation  Face symmetric  Tongue midline without fasciculation  Facial sensation intact light touch  Hearing intact to conversation, light finger rub bilaterally  Motor:  Bilateral SCM, shoulder shrug, deltoid, bicep, tricep, , wrist extension, hand intrinsics                IP, thigh adduction, thigh abduction, quadriceps, hamstrings, dorsiflexion,                Plantar flexion, foot inversion, foot eversion, EHL 5/5 no pronator " drift  Sensation:  Intact touch face, neck, torso, four extremities  Reflex:  No Singer's no clonus  Finger-nose-finger, ZELDA unremarkable    Psychiatric: behavior unremarkable     RADIOLOGY  OUTSIDE IMAGES-CT THORACIC SPINE   Final Result       OUTSIDE IMAGES-CT LUMBAR SPINE   Final Result       OUTSIDE IMAGES-CT HEAD   Final Result       OUTSIDE IMAGES-CT CERVICAL SPINE   Final Result       DX-CHEST-LIMITED (1 VIEW)    (Results Pending)   DX-PELVIS-1 OR 2 VIEWS    (Results Pending)   CT-HEAD W/O    (Results Pending)      The radiologist's interpretation of all radiological studies have been reviewed by me.     COURSE & MEDICAL DECISION MAKING  Assessment and plan: 78-year-old female who fell.  By report she has intracranial abnormalities, however there is no reported there are no images.  Recommend repeating head CT.  At this time she has a GCS of 15.  I recommend continued observation.  She should not take aspirin Plavix or any antiplatelet or anticoagulation medication at this point.

## 2019-03-30 NOTE — CARE PLAN
Problem: Knowledge Deficit  Goal: Knowledge of disease process/condition, treatment plan, diagnostic tests, and medications will improve  Outcome: PROGRESSING SLOWER THAN EXPECTED  Patient still not following commands at this time and does not appear to understand treatment goals at this time.

## 2019-03-30 NOTE — PROCEDURES
DATE OF OPERATION: 3/29/2019     PREOPERATIVE DIAGNOSIS: Acute respiratory failure, traumatic brain injury    POSTOPERATIVE DIAGNOSIS: Same.     PROCEDURE PERFORMED: Rapid sequence endotracheal intubation.    SURGEON: Geo Gonsalez M.D.     INDICATIONS: The patient is a 78 y.o. female with traumatic brain injury, depressed GCS and vomiting with concern for aspiration and hypoxia.  Rapid sequence endotracheal intubation is carried out at the bedside to secure a definitve airway.     PROCEDURE: Following implied emergent consent, the patient was properly identified and optimally positioned in bed. .She was preoxygenated with 100% oxygen. A rapid sequence induction consisting of propofol and vecuronium was administered intravenously.    The vocal cords were visualized transorally with a [place] Scope but she did not have enough room between her cords to pass a 7.5 ETT. Therefore, a cuffed 7 endotracheal tube was passed and the cuff inflated. End tidal CO2 monitoring confirmed return of carbon dioxide. The tube was secured.     The patient tolerated the procedure well. There were no apparent complications.    ____________________________________   Geo Gonsalez M.D.    DD: 3/29/2019  8:05 PM

## 2019-03-30 NOTE — PROGRESS NOTES
Neurosurgery Progress Note    Subjective:  Patient with bifrontal and temporal contusions following fall where she hit the back of her head. Came in with a high GCS but had emesis resulting in aspiration and need for intubation. On propofol    Exam:  Intubated.   Anisicoria  No eye opening  Withdraws x four  Does not follow at this point but is sedated.       BP  Min: 112/56  Max: 189/79  Pulse  Av.6  Min: 68  Max: 107  Resp  Av  Min: 16  Max: 41  Temp  Av °C (98.6 °F)  Min: 35.8 °C (96.4 °F)  Max: 37.7 °C (99.9 °F)  Monitored Temp 2  Av.6 °C (99.7 °F)  Min: 37.3 °C (99.1 °F)  Max: 37.8 °C (100 °F)  SpO2  Av %  Min: 87 %  Max: 100 %    No Data Recorded    Recent Labs      19   15219   0519   WBC  7.3  17.1*  16.9*   RBC  3.84*  3.72*  3.52*   HEMOGLOBIN  11.4*  11.2*  10.4*   HEMATOCRIT  34.9*  34.5*  32.8*   MCV  90.9  92.7  93.2   MCH  29.7  30.1  29.5   MCHC  32.7*  32.5*  31.7*   RDW  14.6*  49.6  50.0   PLATELETCT  221  222  265   MPV  11.4*  11.4  11.2     Recent Labs      19   15219   0519   SODIUM  134*  133*  131*   POTASSIUM  4.8  4.9  4.9   CHLORIDE  99  101  100   CO2  21  17*  23   GLUCOSE  98  142*  180*   BUN  24*  23*  22   CREATININE  1.7*  1.45*  1.11   CALCIUM  10.9  10.5  10.2     Recent Labs      19   152   APTT   --   29.4   INR  1.01  1.06     Recent Labs      19   1521  19   2310  19   0518   REACTMIN  4.5*  4.3*  4.3*   CLOTKINET  1.0  1.1  1.0   CLOTANGL  75.4*  74.3*  75.5*   MAXCLOTS  76.9*  76.3*  77.4*   CVQ16KMG  0.0  0.0  0.0   PRCINADP  77.3  93.6  93.5   PRCINAA  94.8  85.6  78.0       Intake/Output       19 - 1959 19 - 1959       Total  Total       Intake    I.V.  200  1510 1710  --  -- --    Pre-Hospital Volume 0 -- 0 -- -- --    Trauma Resuscitation Volume 0 -- 0 -- -- --     Propofol Volume -- 310 310 -- -- --    Volume (mL) (NS infusion) 200 1200 1400 -- -- --    Blood  0  558 558  --  -- --    PRBC Total Volume (Non-Barcoded) 0 -- 0 -- -- --    FFP Total Volume (Non-Barcoded) 0 -- 0 -- -- --    Platelets Total Volume (Non-Barcoded) 0 -- 0 -- -- --    Cryoprecipitate (Pooled) Total Volume (Non-Barcoded) 0 -- 0 -- -- --    Volume (RELEASE PLATELET PHERESIS) -- 270 270 -- -- --    Volume (RELEASE PLATELET PHERESIS) -- 288 288 -- -- --    Total Intake 200 2068 2268 -- -- --       Output    Urine  0  940 940  --  -- --    Number of Times Voided -- 0 x 0 x -- -- --    Urine Void (mL) 0 0 0 -- -- --    Output (mL) (Urethral Catheter Temperature probe) -- 940 940 -- -- --    Emesis  50  -- 50  --  -- --    Emesis 50 -- 50 -- -- --    Emesis - Number of Times 1 x -- 1 x -- -- --    Other  0  -- 0  --  -- --    Pre-Hospital Output 0 -- 0 -- -- --    Trauma Resuscitation Output 0 -- 0 -- -- --    Stool  --  -- --  --  -- --    Number of Times Stooled 0 x 0 x 0 x 0 x -- 0 x    Emesis/NG output  --  500 500  --  -- --    Output (mL) (Enteral Tube 03/29/19 Orogastric Oral) -- 500 500 -- -- --    Blood  0  -- 0  --  -- --    Est. Blood Loss 0 -- 0 -- -- --    Total Output 50 1440 1490 -- -- --       Net I/O     150 628 778 -- -- --            Intake/Output Summary (Last 24 hours) at 03/30/19 1344  Last data filed at 03/30/19 0600   Gross per 24 hour   Intake          2268.04 ml   Output             1490 ml   Net           778.04 ml            • busPIRone  7.5 mg BID   • citalopram  20 mg DAILY   • levothyroxine  125 mcg AM ES   • donepezil  10 mg Q EVENING   • fentaNYL   mcg Q HOUR PRN   • hydrALAZINE  20 mg Q6HRS PRN   • Pharmacy Consult Request  1 Each PHARMACY TO DOSE   • bisacodyl  10 mg Q24HRS PRN   • fleet  1 Each Once PRN   • NS   Continuous   • ondansetron  4 mg Q4HRS PRN   • levETIRAcetam (KEPPRA) IV  500 mg BID   • insulin regular  3-14 Units Q6HRS    And   • dextrose 50%  25 mL Q15  MIN PRN   • haloperidol lactate  1 mg Q4HRS PRN   • propofol  0-80 mcg/kg/min Continuous   • Respiratory Care per Protocol   Continuous RT   • Pharmacy  1 Each PHARMACY TO DOSE   • docusate sodium 100mg/10mL  100 mg BID   • polyethylene glycol/lytes  1 Packet BID   • oxyCODONE immediate release  10 mg Q3HRS PRN   • oxyCODONE immediate-release  5 mg Q3HRS PRN   • magnesium hydroxide  30 mL DAILY   • losartan  100 mg DAILY   • senna-docusate  1 Tab Q24HRS PRN   • simvastatin  20 mg Q EVENING   • famotidine  20 mg Q24HRS    Or   • famotidine  20 mg Q24HRS   • senna-docusate  1 Tab Nightly       Assessment and Plan:  Hospital day #2  POD #0  Prophylactic anticoagulation: no       Start date/time: Depends on resoling frontal contusions    Patient has been stable since intubation.   CT of the brain shows more prominent frontal contusions but mass effect is not great.   Has bilateral temporal contusions vs TSAH    Neuro stable at this point.  Will follow.

## 2019-03-30 NOTE — PROGRESS NOTES
Dr. Gonsalez at bedside preparing for intubation and a bronch. Vital signs stable. Patient sedated with propofol and vecuronium. See MAR for details.

## 2019-03-30 NOTE — RESPIRATORY CARE
Intubation Assist    Intubation assist performed yes  Reason for intubation airway protection  Positive Color Change on EZCap? yes  Difficult Intubation/Number of attempts no, two attempts  Evidence of aspiration no    Airway ETT Oral 7.0-Secured At  (cm): 22 (03/29/19 2124)

## 2019-03-30 NOTE — PROCEDURES
Procedure Report    Date of Procedure: 3/29/2019    Surgeon: Geo Gonsalez MD.    Diagnosis: aspiration pneumonitis    Procedure: Flexible bronchoscopy and broncho-alveolar lavage    Indications: traumatic brain injury, aspiration    Findings: some gastric contents coating the trachea but no appreciable gastric contents in lower airways.    Procedure in detail: The patient was preoxygenated with 100% FiO2 and sedated with propofol and vecuronium.  The bronchoscope was advanced down the endotracheal tube and the right and left segmental and subsegmental airways were explored.  There was some greenish fluid coating the trachea but very little contents in the lower airways. 12mLs of saline were injected into the lower trachea, suctioned, and sent as a specimen.  Once the airways were clear, the bronchoscope was removed.   The patient tolerated the procedure well.    Specimen: none      Geo Gonsalez MD  [facility]  [phone]

## 2019-03-30 NOTE — PROGRESS NOTES
2 RN skin check complete:     Patient's posterior head red, tender. Wound consult placed.  No breakdown on sacrum, mepilex applied for preventative purposes.  Generalized bruising on all extremities.  Small R lower lip lac, open to air.  Heels pink and blanching.  Abrasions on L ankle.

## 2019-03-30 NOTE — CARE PLAN
Problem: Safety  Goal: Will remain free from injury  Outcome: PROGRESSING AS EXPECTED  Bed is locked and in low position. Bed alarm is on. Patient is close to nursing station. Family at bedside. Patient is checked on frequently      Problem: Knowledge Deficit  Goal: Knowledge of disease process/condition, treatment plan, diagnostic tests, and medications will improve  Outcome: PROGRESSING AS EXPECTED  Patient educated on need for whitaker. Patient educated on restraints, no evidence of learning. Patient educated on SCDs. Family updated on plan of care. All questions answered.

## 2019-03-30 NOTE — PROGRESS NOTES
MD Nassar at bedside with patient. Discussed patient care, POLST, patient interactions and plan for patient. No new orders received at this time.

## 2019-03-30 NOTE — PROGRESS NOTES
Spoke with patient's eldest daughter at this time. We discussed patient's code status and she has agreed to bring in associated paperwork to help clear up the confusion of the POLST currently on file. Patient's younger daughter Tatianna at bedside.

## 2019-03-30 NOTE — PROGRESS NOTES
MD Bowman at bedside with patient's daughters. Patient condition discussed and daughters' questions and concerns discussed.

## 2019-03-30 NOTE — PROGRESS NOTES
2 RN skin check complete.   - Posterior head is read and non blanching. Picture taken upon admission.   - Generalized bruising on extremities.   - Abrasions on L ankle  - R lower lip laceration.

## 2019-03-30 NOTE — CARE PLAN
Problem: Ventilation Defect:  Goal: Ability to achieve and maintain unassisted ventilation or tolerate decreased levels of ventilator support    Intervention: Support and monitor invasive and noninvasive mechanical ventilation  Adult Ventilation Update    Total Vent Days: 2    Patient Lines/Drains/Airways Status    Active Airway     Name: Placement date: Placement time: Site: Days:    Airway ETT Oral 7.0 03/29/19      Oral   2               Static Compliance (ml / cm H2O): 52 (03/29/19 2303)    Sputum/Suction yes  Cough: Productive (03/30/19 0202)  Sputum Amount: Small (03/30/19 0202)  Sputum Color: White;Tan (03/30/19 0202)  Sputum Consistency: Thin;Thick (03/30/19 0202)

## 2019-03-31 NOTE — PROGRESS NOTES
2 RN skin check complete.   - Posterior head red and nonblanching. Waffle cushion in place and wound consult in.   - Generalized bruising on all extremities.   - Small R lower lip lac, open to air  - Abrasion on L ankle.

## 2019-03-31 NOTE — PROGRESS NOTES
Dr. Bowman at bedside, discussed plans for comfort care this afternoon once grandchildren have been to the bedside.  All questions answered at this time, family to contact patient's .

## 2019-03-31 NOTE — CARE PLAN
Problem: Safety  Goal: Will remain free from injury  Outcome: PROGRESSING AS EXPECTED  Bed is locked and in low position. Bed alarm is on. Patient is close to nursing station. Family at bedside. Patient check on hourly      Problem: Skin Integrity  Goal: Risk for impaired skin integrity will decrease  Outcome: PROGRESSING AS EXPECTED  Patient is turned every 2 hours. Pillows under heels to float them. Pillows under elbows. Waffle cushion under head.

## 2019-03-31 NOTE — CARE PLAN
Problem: Skin Integrity  Goal: Risk for impaired skin integrity will decrease  Patient turned every two hours, waffle cushion pillow in use behind head and heels floated on pillows.

## 2019-03-31 NOTE — CARE PLAN
Problem: Ventilation Defect:  Goal: Ability to achieve and maintain unassisted ventilation or tolerate decreased levels of ventilator support    Intervention: Support and monitor invasive and noninvasive mechanical ventilation  Adult Ventilation Update    Total Vent Days: 3    Patient Lines/Drains/Airways Status    Active Airway     Name: Placement date: Placement time: Site: Days:    Airway ETT Oral 7.0 03/29/19      Oral   3              Barriers to Wean: Other (Comments) (HTN) (03/30/19 0635)  Barriers to SBT Weaning Trial Stopped due to:: Hemodynamically unstable/new or worsening arrythmia/SBP <80 or >180 mmHg (201/85) (03/30/19 1003)  Length of Weaning Trial Length of Weaning Trial (Hours): 7 min (03/30/19 1003)    Cough: Productive (03/31/19 0219)  Sputum Amount: Scant (03/31/19 0219)  Sputum Color: Clear;White (03/31/19 0219)  Sputum Consistency: Thin (03/31/19 0219)    Mobility  Level of Mobility: Level IV (03/30/19 2200)  Activity Performed: Edge of bed (03/30/19 2200)  Time Activity Tolerated: 3 min (03/30/19 2200)  Distance Per Occurrence (ft.): 0 feet (03/30/19 2200)  # of Times Distance was Traveled: 2 (03/30/19 1200)  Assistance / Tolerance: Assistance of Two or More (03/30/19 2200)  Pt Calls for Assistance: No (03/30/19 2200)  Staff Present for Mobilization: RN;CNA (03/30/19 2200)  Gait: Unable to Ambulate (03/30/19 2200)  Assistive Devices: Hand held assist (03/30/19 2200)  Reason Not Mobilized: Unstable condition (new admit) (03/29/19 2200)  Mobilization Comments:  (Intubated due to respiratory failure, reevaluate in the AM) (03/29/19 2200)

## 2019-03-31 NOTE — PROGRESS NOTES
Trauma / Surgical Daily Progress Note    Date of Service  3/30/2019    Chief Complaint  78 y.o. female admitted 3/29/2019 with Trauma    Interval Events    No significant neurologic improvement.  POLST reviewed with family  Understanding of poor prognosis  Awaiting arrival of other family members  Family wishes no further transfusions as possible withdrawal of care in next few days    Review of Systems  Review of Systems   Unable to perform ROS: Intubated        Vital Signs for last 24 hours  Temp:  [37.2 °C (99 °F)-37.7 °C (99.9 °F)] 37.2 °C (99 °F)  Pulse:  [68-91] 86  Resp:  [14-33] 25  BP: (120-151)/(58-73) 120/58  SpO2:  [95 %-100 %] 96 %    Hemodynamic parameters for last 24 hours       Respiratory Data     Respiration: (!) 25, Pulse Oximetry: 96 %     Work Of Breathing / Effort: Vented  RUL Breath Sounds: Clear, RML Breath Sounds: Diminished, RLL Breath Sounds: Diminished, DILIP Breath Sounds: Clear, LLL Breath Sounds: Diminished    Physical Exam  Physical Exam   Constitutional: She appears well-developed and well-nourished.   HENT:   Head: Normocephalic.   Eyes: No scleral icterus.   Cardiovascular: Normal rate and regular rhythm.    Pulmonary/Chest: Effort normal and breath sounds normal. No respiratory distress. She exhibits no tenderness.   Abdominal: Soft. Bowel sounds are normal. She exhibits no distension. There is no tenderness.   Genitourinary:   Genitourinary Comments: Cochran to gravity.   Musculoskeletal: She exhibits no edema, tenderness or deformity.   Neurological:   Intubated and sedated  Minimal response   Skin: Skin is warm and dry.   Nursing note and vitals reviewed.      Laboratory  Recent Results (from the past 24 hour(s))   [facility] ARTERIAL BLOOD GAS    Collection Time: 03/29/19 11:03 PM   Result Value Ref Range    Ph 7.345 (L) 7.400 - 7.500    Pco2 37.7 (H) 26.0 - 37.0 mmHg    Po2 94 (H) 64 - 87 mmHg    Tco2 22 20 - 33 mmol/L    S02 97 93 - 99 %    Hco3 20.6 17.0 - 25.0 mmol/L    BE -5 (L) -4  - 3 mmol/L    Body Temp 99.9 F degrees    O2 Therapy 70 %    iPF Ratio 134     Ph Temp [place] 7.335 (L) 7.400 - 7.500    Pco2 Temp Co 39.0 (H) 26.0 - 37.0 mmHg    Po2 Temp Cor 99 (H) 64 - 87 mmHg    Specimen Arterial     Action Range Triggered NO     Inst. Qualified Patient YES    PLATELET MAPPING WITH BASIC TEG    Collection Time: 03/29/19 11:10 PM   Result Value Ref Range    Reaction Time Initial-R 4.3 (L) 5.0 - 10.0 min    Clot Kinetics-K 1.1 1.0 - 3.0 min    Clot Angle-Angle 74.3 (H) 53.0 - 72.0 degrees    Maximum Clot Strength-MA 76.3 (H) 50.0 - 70.0 mm    Lysis 30 minutes-LY30 0.0 0.0 - 8.0 %    % Inhibition ADP 93.6 %    % Inhibition AA 85.6 %    TEG Algorithm Link Algorithm    ACCU-CHEK GLUCOSE    Collection Time: 03/30/19 12:52 AM   Result Value Ref Range    Glucose - Accu-Ck 176 (H) 65 - 99 mg/dL   ACCU-CHEK GLUCOSE    Collection Time: 03/30/19  5:09 AM   Result Value Ref Range    Glucose - Accu-Ck 164 (H) 65 - 99 mg/dL   ABO AND RH CONFIRMATION    Collection Time: 03/30/19  5:17 AM   Result Value Ref Range    ABO Confirm A     Second Rh Group POS    TSH WITH REFLEX TO FT4    Collection Time: 03/30/19  5:18 AM   Result Value Ref Range    TSH 1.380 0.380 - 5.330 uIU/mL   PLATELET MAPPING WITH BASIC TEG    Collection Time: 03/30/19  5:18 AM   Result Value Ref Range    Reaction Time Initial-R 4.3 (L) 5.0 - 10.0 min    Clot Kinetics-K 1.0 1.0 - 3.0 min    Clot Angle-Angle 75.5 (H) 53.0 - 72.0 degrees    Maximum Clot Strength-MA 77.4 (H) 50.0 - 70.0 mm    Lysis 30 minutes-LY30 0.0 0.0 - 8.0 %    % Inhibition ADP 93.5 %    % Inhibition AA 78.0 %    TEG Algorithm Link Algorithm    CBC with Differential: Tomorrow AM    Collection Time: 03/30/19  5:19 AM   Result Value Ref Range    WBC 16.9 (H) 4.8 - 10.8 K/uL    RBC 3.52 (L) 4.20 - 5.40 M/uL    Hemoglobin 10.4 (L) 12.0 - 16.0 g/dL    Hematocrit 32.8 (L) 37.0 - 47.0 %    MCV 93.2 81.4 - 97.8 fL    MCH 29.5 27.0 - 33.0 pg    MCHC 31.7 (L) 33.6 - 35.0 g/dL    RDW 50.0  35.9 - 50.0 fL    Platelet Count 265 164 - 446 K/uL    MPV 11.2 9.0 - 12.9 fL    Neutrophils-Polys 87.90 (H) 44.00 - 72.00 %    Lymphocytes 4.70 (L) 22.00 - 41.00 %    Monocytes 6.80 0.00 - 13.40 %    Eosinophils 0.00 0.00 - 6.90 %    Basophils 0.20 0.00 - 1.80 %    Immature Granulocytes 0.40 0.00 - 0.90 %    Nucleated RBC 0.00 /100 WBC    Neutrophils (Absolute) 14.84 (H) 2.00 - 7.15 K/uL    Lymphs (Absolute) 0.80 (L) 1.00 - 4.80 K/uL    Monos (Absolute) 1.15 (H) 0.00 - 0.85 K/uL    Eos (Absolute) 0.00 0.00 - 0.51 K/uL    Baso (Absolute) 0.04 0.00 - 0.12 K/uL    Immature Granulocytes (abs) 0.06 0.00 - 0.11 K/uL    NRBC (Absolute) 0.00 K/uL   Comp Metabolic Panel (CMP): Tomorrow AM    Collection Time: 03/30/19  5:19 AM   Result Value Ref Range    Sodium 131 (L) 135 - 145 mmol/L    Potassium 4.9 3.6 - 5.5 mmol/L    Chloride 100 96 - 112 mmol/L    Co2 23 20 - 33 mmol/L    Anion Gap 8.0 0.0 - 11.9    Glucose 180 (H) 65 - 99 mg/dL    Bun 22 8 - 22 mg/dL    Creatinine 1.11 0.50 - 1.40 mg/dL    Calcium 10.2 8.5 - 10.5 mg/dL    AST(SGOT) 28 12 - 45 U/L    ALT(SGPT) 15 2 - 50 U/L    Alkaline Phosphatase 77 30 - 99 U/L    Total Bilirubin 0.9 0.1 - 1.5 mg/dL    Albumin 4.0 3.2 - 4.9 g/dL    Total Protein 7.0 6.0 - 8.2 g/dL    Globulin 3.0 1.9 - 3.5 g/dL    A-G Ratio 1.3 g/dL   ESTIMATED GFR    Collection Time: 03/30/19  5:19 AM   Result Value Ref Range    GFR If  57 (A) >60 mL/min/1.73 m 2    GFR If Non  47 (A) >60 mL/min/1.73 m 2   ACCU-CHEK GLUCOSE    Collection Time: 03/30/19 11:55 AM   Result Value Ref Range    Glucose - Accu-Ck 168 (H) 65 - 99 mg/dL   HEMOGLOBIN A1C    Collection Time: 03/30/19  3:24 PM   Result Value Ref Range    Glycohemoglobin 6.1 (H) 0.0 - 5.6 %    Est Avg Glucose 128 mg/dL   MAGNESIUM    Collection Time: 03/30/19  3:25 PM   Result Value Ref Range    Magnesium 1.4 (L) 1.5 - 2.5 mg/dL   PHOSPHORUS    Collection Time: 03/30/19  3:25 PM   Result Value Ref Range     Phosphorus 2.9 2.5 - 4.5 mg/dL   Basic Metabolic Panel    Collection Time: 03/30/19  3:25 PM   Result Value Ref Range    Sodium 130 (L) 135 - 145 mmol/L    Potassium 4.3 3.6 - 5.5 mmol/L    Chloride 100 96 - 112 mmol/L    Co2 21 20 - 33 mmol/L    Glucose 142 (H) 65 - 99 mg/dL    Bun 23 (H) 8 - 22 mg/dL    Creatinine 0.98 0.50 - 1.40 mg/dL    Calcium 9.9 8.5 - 10.5 mg/dL    Anion Gap 9.0 0.0 - 11.9   ESTIMATED GFR    Collection Time: 03/30/19  3:25 PM   Result Value Ref Range    GFR If African American >60 >60 mL/min/1.73 m 2    GFR If Non African American 55 (A) >60 mL/min/1.73 m 2   ACCU-CHEK GLUCOSE    Collection Time: 03/30/19  5:22 PM   Result Value Ref Range    Glucose - Accu-Ck 127 (H) 65 - 99 mg/dL       Fluids    Intake/Output Summary (Last 24 hours) at 03/30/19 2048  Last data filed at 03/30/19 2000   Gross per 24 hour   Intake           3393.3 ml   Output             1900 ml   Net           1493.3 ml       Core Measures & Quality Metrics  EKG reviewed, Medications reviewed, Radiology images reviewed and Labs reviewed  Cochran catheter: Critically Ill - Requiring Accurate Measurement of Urinary Output      DVT Prophylaxis: Contraindicated - High bleeding risk  DVT prophylaxis - mechanical: SCDs  Ulcer prophylaxis: Yes        ALIZA Score  ETOH Screening    Assessment/Plan  Platelet dysfunction due to aspirin (HCC)- (present on admission)   Assessment & Plan    Admission AA inhibition > 90%  Transfuse 1 U platelets and repeat platelet mapping TEG     Intracranial bleed (HCC)- (present on admission)   Assessment & Plan    Areas of mild subarachnoid hemorrhage are noted involving the right and left upper lobes, with more substantial subarachnoid hemorrhage involving the right and left frontal lobes.  Interval follow up head CT with evolving bilateral frontal and temporal hemorrhagic contusions, more apparent than prior exam and stable bilateral subarachnoid and subdural hemorrhage  Prophylactic Keppra x 7  days  Cognitive eval when appropriate   Non operative management  Danyel Jeong MD. Neurosurgery.     Hypomagnesemia- (present on admission)   Assessment & Plan    Magnesium low - replace and follow.     Anisocoria- (present on admission)   Assessment & Plan    History of eye surgery  Unequal pupils on arrival      Contraindication to anticoagulation therapy- (present on admission)   Assessment & Plan    Systemic anticoagulation contraindicated secondary to elevated bleeding risk.  3/29 Surveillance venous duplex scanning ordered.     Encounter for geriatric assessment- (present on admission)   Assessment & Plan    Consult called      Fracture of occipital condyle (HCC)- (present on admission)   Assessment & Plan    Nondisplaced stellate fracture nondisplaced oblique fracture involving the occipital bone, with dominant fracture lines extending anteriorly, to involve both  temporal bones, terminating within the temporal mandibular joints.  Non-operative management.  Danyel Jeong MD. Neurosurgery.     Obesity (BMI 30-39.9)- (present on admission)   Assessment & Plan    Remote history of gastric lap banding   BMI 31.2     Dementia- (present on admission)   Assessment & Plan    Per chart review  RX Aricept and Namenda 11/2018     Type 2 diabetes mellitus without complication (HCC)- (present on admission)   Assessment & Plan    Sliding scale in ICU      Hypothyroid- (present on admission)   Assessment & Plan    Chronic condition treated with Synthroid.  Resume maintenance medication when appropriate      Hypertension- (present on admission)   Assessment & Plan    Chronic condition treated with losartan   Resumed maintenance medication          Discussed patient condition with Family, RN, RT, Pharmacy and neurosurgery and trauma surgery.  CRITICAL CARE TIME EXCLUDING PROCEDURES: 40 minutes

## 2019-03-31 NOTE — CARE PLAN
Problem: Pain Management  Goal: Pain level will decrease to patient's comfort goal  Plan to go comfort care this afternoon, patient kept comfortable with positioning.

## 2019-03-31 NOTE — PROGRESS NOTES
Trauma / Surgical Daily Progress Note    Date of Service  3/31/2019    Chief Complaint  78 y.o. female admitted 3/29/2019 with Trauma    Interval Events    Family at bedside  Discussion regarding her wishes an her POLST  Lots of questions about transition to comfort care  Awaiting arrival of additional family members this afternoon    Review of Systems  Review of Systems   Unable to perform ROS: Intubated        Vital Signs for last 24 hours  Pulse:  [60-95] 91  Resp:  [13-25] 19  SpO2:  [94 %-97 %] 96 %    Hemodynamic parameters for last 24 hours       Respiratory Data     Respiration: 19, Pulse Oximetry: 96 %     Work Of Breathing / Effort: Vented  RUL Breath Sounds: Clear, RML Breath Sounds: Diminished, RLL Breath Sounds: Diminished, DILIP Breath Sounds: Clear, LLL Breath Sounds: Diminished    Physical Exam  Physical Exam   Constitutional: She appears well-developed and well-nourished.   HENT:   Head: Normocephalic.   Eyes: No scleral icterus.   Cardiovascular: Normal rate and regular rhythm.    Pulmonary/Chest: Effort normal and breath sounds normal. No respiratory distress. She exhibits no tenderness.   Abdominal: Soft. Bowel sounds are normal. She exhibits no distension. There is no tenderness.   Genitourinary:   Genitourinary Comments: Occhran to gravity.   Musculoskeletal: She exhibits no edema, tenderness or deformity.   Neurological:   Intubated and sedated  Minimal response   Skin: Skin is warm and dry.   Nursing note and vitals reviewed.      Laboratory  Recent Results (from the past 24 hour(s))   HEMOGLOBIN A1C    Collection Time: 03/30/19  3:24 PM   Result Value Ref Range    Glycohemoglobin 6.1 (H) 0.0 - 5.6 %    Est Avg Glucose 128 mg/dL   PLATELET MAPPING WITH BASIC TEG    Collection Time: 03/30/19  3:24 PM   Result Value Ref Range    Reaction Time Initial-R 4.2 (L) 5.0 - 10.0 min    Clot Kinetics-K 1.0 1.0 - 3.0 min    Clot Angle-Angle 74.9 (H) 53.0 - 72.0 degrees    Maximum Clot Strength-MA 77.8 (H)  50.0 - 70.0 mm    Lysis 30 minutes-LY30 0.0 0.0 - 8.0 %    % Inhibition ADP 88.6 %    % Inhibition AA 67.6 %    TEG Algorithm Link Algorithm    MAGNESIUM    Collection Time: 03/30/19  3:25 PM   Result Value Ref Range    Magnesium 1.4 (L) 1.5 - 2.5 mg/dL   PHOSPHORUS    Collection Time: 03/30/19  3:25 PM   Result Value Ref Range    Phosphorus 2.9 2.5 - 4.5 mg/dL   Basic Metabolic Panel    Collection Time: 03/30/19  3:25 PM   Result Value Ref Range    Sodium 130 (L) 135 - 145 mmol/L    Potassium 4.3 3.6 - 5.5 mmol/L    Chloride 100 96 - 112 mmol/L    Co2 21 20 - 33 mmol/L    Glucose 142 (H) 65 - 99 mg/dL    Bun 23 (H) 8 - 22 mg/dL    Creatinine 0.98 0.50 - 1.40 mg/dL    Calcium 9.9 8.5 - 10.5 mg/dL    Anion Gap 9.0 0.0 - 11.9   ESTIMATED GFR    Collection Time: 03/30/19  3:25 PM   Result Value Ref Range    GFR If African American >60 >60 mL/min/1.73 m 2    GFR If Non African American 55 (A) >60 mL/min/1.73 m 2   ACCU-CHEK GLUCOSE    Collection Time: 03/30/19  5:22 PM   Result Value Ref Range    Glucose - Accu-Ck 127 (H) 65 - 99 mg/dL   ACCU-CHEK GLUCOSE    Collection Time: 03/31/19 12:13 AM   Result Value Ref Range    Glucose - Accu-Ck 147 (H) 65 - 99 mg/dL   TSH WITH REFLEX TO FT4    Collection Time: 03/31/19  5:00 AM   Result Value Ref Range    TSH 0.840 0.380 - 5.330 uIU/mL   CBC WITH DIFFERENTIAL    Collection Time: 03/31/19  5:00 AM   Result Value Ref Range    WBC 14.3 (H) 4.8 - 10.8 K/uL    RBC 3.38 (L) 4.20 - 5.40 M/uL    Hemoglobin 10.2 (L) 12.0 - 16.0 g/dL    Hematocrit 31.8 (L) 37.0 - 47.0 %    MCV 94.1 81.4 - 97.8 fL    MCH 30.2 27.0 - 33.0 pg    MCHC 32.1 (L) 33.6 - 35.0 g/dL    RDW 51.9 (H) 35.9 - 50.0 fL    Platelet Count 268 164 - 446 K/uL    MPV 11.2 9.0 - 12.9 fL    Neutrophils-Polys 82.00 (H) 44.00 - 72.00 %    Lymphocytes 7.20 (L) 22.00 - 41.00 %    Monocytes 10.00 0.00 - 13.40 %    Eosinophils 0.10 0.00 - 6.90 %    Basophils 0.20 0.00 - 1.80 %    Immature Granulocytes 0.50 0.00 - 0.90 %     Nucleated RBC 0.00 /100 WBC    Neutrophils (Absolute) 11.71 (H) 2.00 - 7.15 K/uL    Lymphs (Absolute) 1.03 1.00 - 4.80 K/uL    Monos (Absolute) 1.43 (H) 0.00 - 0.85 K/uL    Eos (Absolute) 0.02 0.00 - 0.51 K/uL    Baso (Absolute) 0.03 0.00 - 0.12 K/uL    Immature Granulocytes (abs) 0.07 0.00 - 0.11 K/uL    NRBC (Absolute) 0.00 K/uL   Comp Metabolic Panel    Collection Time: 03/31/19  5:00 AM   Result Value Ref Range    Sodium 128 (L) 135 - 145 mmol/L    Potassium 4.0 3.6 - 5.5 mmol/L    Chloride 100 96 - 112 mmol/L    Co2 20 20 - 33 mmol/L    Anion Gap 8.0 0.0 - 11.9    Glucose 129 (H) 65 - 99 mg/dL    Bun 21 8 - 22 mg/dL    Creatinine 0.78 0.50 - 1.40 mg/dL    Calcium 9.5 8.5 - 10.5 mg/dL    AST(SGOT) 21 12 - 45 U/L    ALT(SGPT) 11 2 - 50 U/L    Alkaline Phosphatase 66 30 - 99 U/L    Total Bilirubin 0.8 0.1 - 1.5 mg/dL    Albumin 3.6 3.2 - 4.9 g/dL    Total Protein 6.4 6.0 - 8.2 g/dL    Globulin 2.8 1.9 - 3.5 g/dL    A-G Ratio 1.3 g/dL   MAGNESIUM    Collection Time: 03/31/19  5:00 AM   Result Value Ref Range    Magnesium 1.3 (L) 1.5 - 2.5 mg/dL   PHOSPHORUS    Collection Time: 03/31/19  5:00 AM   Result Value Ref Range    Phosphorus 2.5 2.5 - 4.5 mg/dL   Triglyceride    Collection Time: 03/31/19  5:00 AM   Result Value Ref Range    Triglycerides 113 0 - 149 mg/dL   ESTIMATED GFR    Collection Time: 03/31/19  5:00 AM   Result Value Ref Range    GFR If African American >60 >60 mL/min/1.73 m 2    GFR If Non African American >60 >60 mL/min/1.73 m 2   ACCU-CHEK GLUCOSE    Collection Time: 03/31/19  5:25 AM   Result Value Ref Range    Glucose - Accu-Ck 137 (H) 65 - 99 mg/dL       Fluids    Intake/Output Summary (Last 24 hours) at 03/31/19 1338  Last data filed at 03/31/19 1200   Gross per 24 hour   Intake          3188.53 ml   Output              830 ml   Net          2358.53 ml       Core Measures & Quality Metrics  EKG reviewed, Medications reviewed, Radiology images reviewed and Labs reviewed  Cochran catheter:  Critically Ill - Requiring Accurate Measurement of Urinary Output      DVT Prophylaxis: Contraindicated - High bleeding risk  DVT prophylaxis - mechanical: SCDs  Ulcer prophylaxis: Yes        ALIZA Score  ETOH Screening    Assessment/Plan  Platelet dysfunction due to aspirin (HCC)- (present on admission)   Assessment & Plan    Admission AA inhibition > 90%  Transfuse 1 U platelets and repeat platelet mapping TEG     Intracranial bleed (HCC)- (present on admission)   Assessment & Plan    Areas of mild subarachnoid hemorrhage are noted involving the right and left upper lobes, with more substantial subarachnoid hemorrhage involving the right and left frontal lobes.  Interval follow up head CT with evolving bilateral frontal and temporal hemorrhagic contusions, more apparent than prior exam and stable bilateral subarachnoid and subdural hemorrhage  Prophylactic Keppra x 7 days  Cognitive eval when appropriate   Non operative management  Danyel Jeong MD. Neurosurgery.     Hypomagnesemia- (present on admission)   Assessment & Plan    Magnesium low - replace and follow.     Anisocoria- (present on admission)   Assessment & Plan    History of eye surgery  Unequal pupils on arrival      Contraindication to anticoagulation therapy- (present on admission)   Assessment & Plan    Systemic anticoagulation contraindicated secondary to elevated bleeding risk.  3/29 Surveillance venous duplex scanning ordered.     Encounter for geriatric assessment- (present on admission)   Assessment & Plan    Consult called      Fracture of occipital condyle (HCC)- (present on admission)   Assessment & Plan    Nondisplaced stellate fracture nondisplaced oblique fracture involving the occipital bone, with dominant fracture lines extending anteriorly, to involve both  temporal bones, terminating within the temporal mandibular joints.  Non-operative management.  Danyel Jeong MD. Neurosurgery.     Obesity (BMI 30-39.9)- (present on admission)    Assessment & Plan    Remote history of gastric lap banding   BMI 31.2     Dementia- (present on admission)   Assessment & Plan    Per chart review  RX Aricept and Namenda 11/2018     Type 2 diabetes mellitus without complication (HCC)- (present on admission)   Assessment & Plan    Sliding scale in ICU      Hypothyroid- (present on admission)   Assessment & Plan    Chronic condition treated with Synthroid.  Resume maintenance medication when appropriate      Hypertension- (present on admission)   Assessment & Plan    Chronic condition treated with losartan   Resumed maintenance medication        Discussed patient condition with Family, RN, RT, Pharmacy and neurosurgery and trauma surgery.  CRITICAL CARE TIME EXCLUDING PROCEDURES: 40  minutes

## 2019-03-31 NOTE — PROGRESS NOTES
Neurosurgery Progress Note    Subjective:  Remains intubated, sedated    Exam:  Reaches up for ETT, localizes with mild chest stimulation  Pupils 2 mm R 3 mm L midline reactive  Moves extremities appropriately to stimulation    BP  Min: 120/58  Max: 120/58  Pulse  Av.1  Min: 60  Max: 95  Resp  Av  Min: 13  Max: 25  Temp  Av.2 °C (99 °F)  Min: 37.2 °C (99 °F)  Max: 37.2 °C (99 °F)  Monitored Temp 2  Av.9 °C (98.5 °F)  Min: 36.6 °C (97.9 °F)  Max: 37.5 °C (99.5 °F)  SpO2  Av.8 %  Min: 94 %  Max: 98 %    No Data Recorded    Recent Labs      19   1521  19   0519  19   0500   WBC  17.1*  16.9*  14.3*   RBC  3.72*  3.52*  3.38*   HEMOGLOBIN  11.2*  10.4*  10.2*   HEMATOCRIT  34.5*  32.8*  31.8*   MCV  92.7  93.2  94.1   MCH  30.1  29.5  30.2   MCHC  32.5*  31.7*  32.1*   RDW  49.6  50.0  51.9*   PLATELETCT  222  265  268   MPV  11.4  11.2  11.2     Recent Labs      19   0519  19   1525  19   0500   SODIUM  131*  130*  128*   POTASSIUM  4.9  4.3  4.0   CHLORIDE  100  100  100   CO2  23  21  20   GLUCOSE  180*  142*  129*   BUN  22  23*  21   CREATININE  1.11  0.98  0.78   CALCIUM  10.2  9.9  9.5     Recent Labs      19   1336  19   1521   APTT   --   29.4   INR  1.01  1.06     Recent Labs      19   2310  19   0518  19   1524   REACTMIN  4.3*  4.3*  4.2*   CLOTKINET  1.1  1.0  1.0   CLOTANGL  74.3*  75.5*  74.9*   MAXCLOTS  76.3*  77.4*  77.8*   VGY41BGD  0.0  0.0  0.0   PRCINADP  93.6  93.5  88.6   PRCINAA  85.6  78.0  67.6       Intake/Output       19 - 1959 19 - 1959       Total  Total       Intake    I.V.  1323.7  1840 3163.7  479.1  -- 479.1    Propofol Volume 323.7 440 763.7 79.1 -- 79.1    Volume (mL) (NS infusion) 1000 1400 2400 400 -- 400    Other  80  -- 80  --  -- --    Medications (PO/Enteral Liquids) 80 -- 80 -- -- --    Total Intake 1403.7  1840 3243.7 479.1 -- 479.1       Output    Urine  400  360 760  100  -- 100    Output (mL) (Urethral Catheter Temperature probe) 400 360 760 100 -- 100    Stool  --  -- --  0  -- 0    Number of Times Stooled 0 x 0 x 0 x 0 x -- 0 x    Measurable Stool (mL) -- -- -- 0 -- 0    Total Output 400 360 760 100 -- 100       Net I/O     1003.7 1480 2483.7 379.1 -- 379.1            Intake/Output Summary (Last 24 hours) at 03/31/19 1020  Last data filed at 03/31/19 1000   Gross per 24 hour   Intake          3201.78 ml   Output              690 ml   Net          2511.78 ml            • magnesium sulfate  4 g Once   • levETIRAcetam  500 mg BID   • famotidine  20 mg Q12HRS    Or   • famotidine  20 mg Q12HRS   • busPIRone  7.5 mg BID   • citalopram  20 mg DAILY   • levothyroxine  125 mcg AM ES   • donepezil  10 mg Q EVENING   • fentaNYL   mcg Q HOUR PRN   • hydrALAZINE  20 mg Q6HRS PRN   • Pharmacy Consult Request  1 Each PHARMACY TO DOSE   • bisacodyl  10 mg Q24HRS PRN   • fleet  1 Each Once PRN   • NS   Continuous   • ondansetron  4 mg Q4HRS PRN   • insulin regular  3-14 Units Q6HRS    And   • dextrose 50%  25 mL Q15 MIN PRN   • haloperidol lactate  1 mg Q4HRS PRN   • propofol  0-80 mcg/kg/min Continuous   • Respiratory Care per Protocol   Continuous RT   • Pharmacy  1 Each PHARMACY TO DOSE   • docusate sodium 100mg/10mL  100 mg BID   • polyethylene glycol/lytes  1 Packet BID   • oxyCODONE immediate release  10 mg Q3HRS PRN   • oxyCODONE immediate-release  5 mg Q3HRS PRN   • magnesium hydroxide  30 mL DAILY   • losartan  100 mg DAILY   • senna-docusate  1 Tab Q24HRS PRN   • simvastatin  20 mg Q EVENING   • senna-docusate  1 Tab Nightly       Assessment and Plan:  Hospital day #2 bifrontal contusions, extraaxial blood  POD #NA  Prophylactic anticoagulation: no         Start date/time: tbd  CHI:  Depressed sensorium out of proportion to CT findings; bifrontal contusions not very large, no mass effect, extra-axial blood not  very significant without mass effect; generalize atrophy fairly significant.  Discussion held with family; prognosis unclear given age, probable preexisting dementia  No Neurosurgical intervention anticipated  Hyponatremia:  Increase to normal range

## 2019-03-31 NOTE — PROGRESS NOTES
Family  at bedside, family ready to initiate comfort measures.  Dr. Bowman at bedside, discussed with family.  Orders received and implemented.  Pt extubated to comfort care at 1612.

## 2019-04-01 NOTE — PROGRESS NOTES
2 RN skin check complete.   - - Posterior head red and nonblanching. Waffle cushion in place and wound consult in.   - Generalized bruising on all extremities.   - Small R lower lip lac, open to air  - Abrasion on L ankle.

## 2019-04-01 NOTE — CARE PLAN
Problem: Knowledge Deficit  Goal: Knowledge of disease process/condition, treatment plan, diagnostic tests, and medications will improve  Outcome: PROGRESSING AS EXPECTED  Explaining the comfort care process with family. Answering all family questions. Educating them on the pain medication given.     Problem: Pain Management  Goal: Pain level will decrease to patient's comfort goal  Outcome: PROGRESSING AS EXPECTED  Patient medicated per MAR based on effort of breathing. Patient given extra blankets for comfort.

## 2019-04-01 NOTE — DISCHARGE SUMMARY
DATE OF ADMISSION:  03/29/2019    DATE OF DISCHARGE:  04/01/2019    DISCHARGE DEATH:  Discharged to adin, 's case.    MAIN DIAGNOSES:  1.  Fracture of occipital condyle.  2.  Intracranial bleed.  3.  Platelet dysfunction secondary to aspirin.  4.  Type 2 diabetes.  5.  Anisocoria.  6.  Dementia.  7.  Hypothyroid.  8.  Hypertension.  9.  Obesity, BMI of 31.    DISCHARGE DIAGNOSES:  1.  Acute respiratory failure brought with aspiration.  2.  Intracranial bleed.  3.  Platelet dysfunction secondary to aspirin.  4.  Dementia.  5.  Hypertension.  6.  Hypothyroid.  7.  Anisocoria.  8.  Type 2 diabetes.  9.  Fracture of occipital condyle.  10.  Obesity with BMI of 31.  11.  Hypomagnesemia.    CONSULTS:  Neurosurgical consultant, Dr. Danyel Jeong on admission.    Geriatrics consultant is Dr. Watts on admission.    PROCEDURES:  None.    HOSPITAL COURSE:  Patient transferred due to ground level fall and unequal   pupils, altered mental status.  Patient did present with a pulse that had been   filled out was somewhat difficult to interpret.  One note if she is in   comfort measures only and no sort of long term support.  She was transferred    from ER CAT scan and then to the ICU.  In the ICU, she had several episodes of   vomiting and desaturation with expected aspiration.  She underwent intubation at that   time by Dr. Gonsalez.  The CT evaluation showed a significant bleed in her brain, no   plan for intervention as there were none surgical lesions.  She has significant   platelet dysfunction with elevated AA inhibition.  She was treated for this   and this normalized with multiple units of platelets.  Family arrived and given her   long-term prognosis not being able to likely be a functional independently,   family elected to go with comfort care.  She was supported until all   additional family members were here and was seen by , comfort care was   established in the afternoon of 03/31.  At that time, she  was extubated and   she ultimately  at approximately 0221 hours on , pronounced by 2 RNs   per protocol, particularly the 's case and all parties involved are   notified and was transferred to [facility].       ____________________________________     MD MEERA HERRERA / JESSICA    DD:  2019 08:46:13  DT:  2019 09:40:53    D#:  [id number]  Job#:  [id number]

## 2023-10-17 NOTE — PROCEDURE: REASSURANCE
Detail Level: Zone
Additional Note: Patient is reassured regarding the benign nature of this/these lesion(s).  Patient is encouraged to return for evaluation if lesion(s) grow, change, or becomes symptomatic.
Include Location In Plan?: No
Additional Note: Patient is reassured regarding the benign nature of this/these lesion(s).  Patient is encouraged to return for evaluation if lesion(s) grow, change, or becomes symptomatic.\\n
Island Pedicle Flap-Requiring Vessel Identification Text: The defect edges were debeveled with a #15 scalpel blade.  Given the location of the defect, shape of the defect and the proximity to free margins an island pedicle advancement flap was deemed most appropriate.  Using a sterile surgical marker, an appropriate advancement flap was drawn, based on the axial vessel mentioned above, incorporating the defect, outlining the appropriate donor tissue and placing the expected incisions within the relaxed skin tension lines where possible.    The area thus outlined was incised deep to adipose tissue with a #15 scalpel blade.  The skin margins were undermined to an appropriate distance in all directions around the primary defect and laterally outward around the island pedicle utilizing iris scissors.  There was minimal undermining beneath the pedicle flap.